# Patient Record
Sex: FEMALE | Race: BLACK OR AFRICAN AMERICAN
[De-identification: names, ages, dates, MRNs, and addresses within clinical notes are randomized per-mention and may not be internally consistent; named-entity substitution may affect disease eponyms.]

---

## 2017-12-06 NOTE — RADIOLOGY REPORT (SQ)
EXAM DESCRIPTION:  CHEST PA/LAT



COMPLETED DATE/TIME:  12/6/2017 3:22 pm



REASON FOR STUDY:  cough



COMPARISON:  None.



EXAM PARAMETERS:  NUMBER OF VIEWS: two views

TECHNIQUE: Digital Frontal and Lateral radiographic views of the chest acquired.

RADIATION DOSE: NA

LIMITATIONS: none



FINDINGS:  LUNGS AND PLEURA: No opacities, masses or pneumothorax. No pleural effusion.

MEDIASTINUM AND HILAR STRUCTURES: No masses or contour abnormalities.

HEART AND VASCULAR STRUCTURES: Heart normal size.  No evidence for failure.

BONES: No acute findings.

HARDWARE: None in the chest.

OTHER: No other significant finding.



IMPRESSION:  NO SIGNIFICANT RADIOGRAPHIC FINDING IN THE CHEST.



TECHNICAL DOCUMENTATION:  JOB ID:  [id number]

 2011 [employer]- All Rights Reserved

## 2017-12-06 NOTE — ER DOCUMENT REPORT
HPI





- HPI


Patient complains to provider of: sore throat, cough


Onset/Duration: Persistent


Quality of pain: Achy


Pain Level: 3


Context: 





Patient presents complaining of sore throat for the past 2 weeks with cough for 

the past 4 days.  Patient states that when she coughs it causes her head to 

hurt.  Patient states she has had headache off and on for the past 4 days.  

Patient states she was coughing so hard today that she did vomit after 

coughing.  Patient denies any fever.


Associated Symptoms: Nonproductive cough, Headache, Vomiting - After cough, 

Sore throat.  denies: Fever


Exacerbated by: Coughing


Relieved by: Remaining still


Similar symptoms previously: Yes


Recently seen / treated by doctor: No





- ROS


ROS below otherwise negative: Yes


Systems Reviewed and Negative: Yes All other systems reviewed and negative





- CONSTITUTIONAL


Constitutional: DENIES: Fever, Chills





- EENT


EENT: REPORTS: Sore Throat.  DENIES: Ear Pain, Eye problems





- NEURO


Neurology: REPORTS: Headache





- CARDIOVASCULAR


Cardiovascular: DENIES: Chest pain





- RESPIRATORY


Respiratory: REPORTS: Coughing.  DENIES: Trouble Breathing





- GASTROINTESTINAL


Gastrointestinal: REPORTS: Patient vomiting.  DENIES: Abdominal Pain, Diarrhea





- REPRODUCTIVE


LMP: [id number]


Reproductive: DENIES: Pregnant:





- MUSCULOSKELETAL


Musculoskeletal: DENIES: Back Pain





- DERM


Skin Color: Normal


Skin Problems: None





Past Medical History





- General


Information source: Patient





- Social History


Smoking Status: Current Every Day Smoker


Chew tobacco use (# tins/day): No


Smoking Education Provided: Yes


Frequency of alcohol use: Occasional


Drug Abuse: None


Lives with: Family


Family History: Reviewed & Not Pertinent


Patient has suicidal ideation: No


Patient has homicidal ideation: No





- Medical History


Medical History: Negative


Renal/ Medical History: Denies: Hx Peritoneal Dialysis


Past Surgical History: Reports: Hx  Section





- Immunizations


Hx Diphtheria, Pertussis, Tetanus Vaccination: Yes





Vertical Provider Document





- CONSTITUTIONAL


Agree With Documented VS: Yes


Exam Limitations: No Limitations


General Appearance: WD/WN, No Apparent Distress





- INFECTION CONTROL


TRAVEL OUTSIDE OF THE [place] IN LAST 30 DAYS: No





- HEENT


HEENT: Atraumatic, Normocephalic, Pharyngeal Tenderness, Pharyngeal Erythema.  

negative: Pharyngeal Exudate





- NECK


Neck: Normal Inspection, Supple.  negative: Lymphadenopathy-Left, 

Lymphadenopathy-Right


Notes: 





No meningismus





- RESPIRATORY


Respiratory: No Respiratory Distress, Chest Non-Tender, Wheezing - Only with 

coughing


O2 Sat by Pulse Oximetry: 100





- CARDIOVASCULAR


Cardiovascular: Regular Rate, Regular Rhythm, No Murmur





- BACK


Back: Normal Inspection





- MUSCULOSKELETAL/EXTREMETIES


Musculoskeletal/Extremeties: MAEW, FROM





- NEURO


Level of Consciousness: Awake, Alert, Appropriate


Motor/Sensory: No Motor Deficit


Notes: 





No focal neurologic deficit, cranial nerves II through XII intact





- DERM


Integumentary: Warm, Dry, No Rash





Course





- Vital Signs


Vital signs: 


 











Temp Pulse Resp BP Pulse Ox


 


 98.2 F   74   18   136/80 H  100 


 


 17 13:19  17 13:19  17 13:19  17 13:19  17 13:19














- Diagnostic Test


Radiology reviewed: Reports reviewed





Discharge





- Discharge


Clinical Impression: 


 Sore throat, Bronchospasm





Upper respiratory infection


Qualifiers:


 URI type: unspecified URI Qualified Code(s): J06.9 - Acute upper respiratory 

infection, unspecified





Condition: Stable


Disposition: HOME, SELF-CARE


Instructions:  Acetaminophen, Sore Throat ([facility]), Upper Respiratory Illness ([facility])


Additional Instructions: 


Return immediately for any new or worsening symptoms





Followup with your primary care provider, call tomorrow to make a followup 

appointment





Stop smoking


Prescriptions: 


Albuterol Sulfate [Ventolin Hfa] 2 puff IH Q4HP PRN #17 gm


 PRN Reason: 


Benzonatate [Tessalon Perle 100 mg Capsule] 100 mg PO Q8HP PRN #20 cap


 PRN Reason: 


Naproxen [Naprosyn 250 Nmg Tablet] 1 tab PO BID #14 tablet


Forms:  Smoking Cessation Education, Return to Work


Referrals: 


[facility] [Provider Group] - Follow up tomorrow

## 2018-01-03 ENCOUNTER — HOSPITAL ENCOUNTER (EMERGENCY)
Dept: HOSPITAL 62 - ER | Age: 28
Discharge: HOME | End: 2018-01-03
Payer: SELF-PAY

## 2018-01-03 VITALS — SYSTOLIC BLOOD PRESSURE: 136 MMHG | DIASTOLIC BLOOD PRESSURE: 85 MMHG

## 2018-01-03 DIAGNOSIS — F17.200: ICD-10-CM

## 2018-01-03 DIAGNOSIS — R07.9: Primary | ICD-10-CM

## 2018-01-03 LAB
ADD MANUAL DIFF: NO
ALBUMIN SERPL-MCNC: 4.2 G/DL (ref 3.5–5)
ALP SERPL-CCNC: 87 U/L (ref 38–126)
ALT SERPL-CCNC: 27 U/L (ref 9–52)
ANION GAP SERPL CALC-SCNC: 10 MMOL/L (ref 5–19)
APPEARANCE UR: CLEAR
APTT PPP: YELLOW S
AST SERPL-CCNC: 19 U/L (ref 14–36)
BASOPHILS # BLD AUTO: 0.1 10^3/UL (ref 0–0.2)
BASOPHILS NFR BLD AUTO: 1 % (ref 0–2)
BILIRUB DIRECT SERPL-MCNC: 0.2 MG/DL (ref 0–0.4)
BILIRUB SERPL-MCNC: 0.3 MG/DL (ref 0.2–1.3)
BILIRUB UR QL STRIP: NEGATIVE
BUN SERPL-MCNC: 8 MG/DL (ref 7–20)
CALCIUM: 9.8 MG/DL (ref 8.4–10.2)
CHLORIDE SERPL-SCNC: 105 MMOL/L (ref 98–107)
CO2 SERPL-SCNC: 27 MMOL/L (ref 22–30)
EOSINOPHIL # BLD AUTO: 0.1 10^3/UL (ref 0–0.6)
EOSINOPHIL NFR BLD AUTO: 1.9 % (ref 0–6)
ERYTHROCYTE [DISTWIDTH] IN BLOOD BY AUTOMATED COUNT: 18.6 % (ref 11.5–14)
GLUCOSE SERPL-MCNC: 91 MG/DL (ref 75–110)
GLUCOSE UR STRIP-MCNC: NEGATIVE MG/DL
HCT VFR BLD CALC: 35.1 % (ref 36–47)
HGB BLD-MCNC: 11.6 G/DL (ref 12–15.5)
KETONES UR STRIP-MCNC: NEGATIVE MG/DL
LYMPHOCYTES # BLD AUTO: 1.9 10^3/UL (ref 0.5–4.7)
LYMPHOCYTES NFR BLD AUTO: 37.2 % (ref 13–45)
MCH RBC QN AUTO: 27 PG (ref 27–33.4)
MCHC RBC AUTO-ENTMCNC: 32.9 G/DL (ref 32–36)
MCV RBC AUTO: 82 FL (ref 80–97)
MONOCYTES # BLD AUTO: 0.4 10^3/UL (ref 0.1–1.4)
MONOCYTES NFR BLD AUTO: 7 % (ref 3–13)
NEUTROPHILS # BLD AUTO: 2.7 10^3/UL (ref 1.7–8.2)
NEUTS SEG NFR BLD AUTO: 52.9 % (ref 42–78)
NITRITE UR QL STRIP: NEGATIVE
PH UR STRIP: 6 [PH] (ref 5–9)
PLATELET # BLD: 167 10^3/UL (ref 150–450)
POTASSIUM SERPL-SCNC: 4 MMOL/L (ref 3.6–5)
PROT SERPL-MCNC: 7 G/DL (ref 6.3–8.2)
PROT UR STRIP-MCNC: NEGATIVE MG/DL
RBC # BLD AUTO: 4.28 10^6/UL (ref 3.72–5.28)
SODIUM SERPL-SCNC: 141.6 MMOL/L (ref 137–145)
SP GR UR STRIP: 1.02
TOTAL CELLS COUNTED % (AUTO): 100 %
UROBILINOGEN UR-MCNC: 4 MG/DL (ref ?–2)
WBC # BLD AUTO: 5 10^3/UL (ref 4–10.5)

## 2018-01-03 PROCEDURE — 99285 EMERGENCY DEPT VISIT HI MDM: CPT

## 2018-01-03 PROCEDURE — 36415 COLL VENOUS BLD VENIPUNCTURE: CPT

## 2018-01-03 PROCEDURE — 85379 FIBRIN DEGRADATION QUANT: CPT

## 2018-01-03 PROCEDURE — 71046 X-RAY EXAM CHEST 2 VIEWS: CPT

## 2018-01-03 PROCEDURE — 81001 URINALYSIS AUTO W/SCOPE: CPT

## 2018-01-03 PROCEDURE — 85025 COMPLETE CBC W/AUTO DIFF WBC: CPT

## 2018-01-03 PROCEDURE — 93010 ELECTROCARDIOGRAM REPORT: CPT

## 2018-01-03 PROCEDURE — 81025 URINE PREGNANCY TEST: CPT

## 2018-01-03 PROCEDURE — 80053 COMPREHEN METABOLIC PANEL: CPT

## 2018-01-03 PROCEDURE — 93005 ELECTROCARDIOGRAM TRACING: CPT

## 2018-01-03 NOTE — RADIOLOGY REPORT (SQ)
EXAM DESCRIPTION:  CHEST PA/LAT



COMPLETED DATE/TIME:  1/3/2018 4:43 pm



REASON FOR STUDY:  cp



COMPARISON:  12/6/2017



EXAM PARAMETERS:  NUMBER OF VIEWS: two views

TECHNIQUE: Digital Frontal and Lateral radiographic views of the chest acquired.

RADIATION DOSE: NA

LIMITATIONS: none



FINDINGS:  LUNGS AND PLEURA: No opacities, masses or pneumothorax. No pleural effusion.

MEDIASTINUM AND HILAR STRUCTURES: No masses or contour abnormalities.

HEART AND VASCULAR STRUCTURES: Heart normal size.  No evidence for failure.

BONES: No acute findings.

HARDWARE: None in the chest.

OTHER: No other significant finding.



IMPRESSION:  NO SIGNIFICANT RADIOGRAPHIC FINDING IN THE CHEST.



TECHNICAL DOCUMENTATION:  JOB ID:  [id number]

 2011 [employer]- All Rights Reserved

## 2018-01-03 NOTE — EKG REPORT
SEVERITY:- BORDERLINE ECG -

SINUS RHYTHM

BORDERLINE T ABNORMALITIES, DIFFUSE LEADS

:

Confirmed by: Gage Downey 03-Jan-2018 19:05:55

## 2018-01-03 NOTE — ER DOCUMENT REPORT
ED Cardiac





- General


Chief Complaint: Chest Pain


Stated Complaint: CHEST PAIN


Time Seen by Provider: 18 16:30


Mode of Arrival: Ambulatory


Information source: Patient


Notes: 





Patient presents stating she has had chest pain since this morning.  It did get 

better after aspirin and nitroglycerin by ambulance.  She states nothing 

otherwise made the pain better or worse.  The pain was in the center of her 

chest.  She felt somewhat short of breath with it.  Patient denies any chronic 

cough cold or congestion.  No previous history of cardiac disease or DVTs.  She 

has had no recent long plane rides or car trips.  No recent surgeries.  She 

does not take any type of hormone therapy.  The pain did not radiate.  It was a 

sharp sensation.  It was intermittent.  No rashes.


TRAVEL OUTSIDE OF THE [place] IN LAST 30 DAYS: No





- Related Data


Allergies/Adverse Reactions: 


 





No Known Allergies Allergy (Verified 18 15:17)


 











Past Medical History





- General


Information source: Patient





- Social History


Smoking Status: Current Every Day Smoker


Chew tobacco use (# tins/day): No


Frequency of alcohol use: Occasional


Drug Abuse: None


Family History: Reviewed & Not Pertinent


Patient has suicidal ideation: No


Patient has homicidal ideation: No


Renal/ Medical History: Denies: Hx Peritoneal Dialysis


Past Surgical History: Reports: Hx  Section





- Immunizations


Hx Diphtheria, Pertussis, Tetanus Vaccination: Yes





Review of Systems





- Review of Systems


Constitutional: denies: Chills, Fever


Cardiovascular: Chest pain.  denies: Palpitations


Respiratory: Short of breath.  denies: Cough


Gastrointestinal: denies: Diarrhea, Vomiting


-: Yes All other systems reviewed and negative





Physical Exam





- Vital signs


Vitals: 





 











Temp Pulse Resp BP Pulse Ox


 


 98.8 F   72   18   132/75 H  100 


 


 18 15:41  18 15:41  18 15:41  18 15:41  18 15:41











Interpretation: Hypertensive





- General


General appearance: Appears well, Alert





- HEENT


Head: Normocephalic, Atraumatic


Eyes: Normal


Pupils: PERRL





- Respiratory


Respiratory status: No respiratory distress


Chest status: Nontender


Breath sounds: Normal


Chest palpation: Normal





- Cardiovascular


Rhythm: Regular


Heart sounds: Normal auscultation


Murmur: No





- Abdominal


Inspection: Normal


Distension: No distension


Bowel sounds: Normal


Tenderness: Nontender


Organomegaly: No organomegaly





- Back


Back: Normal, Nontender





- Extremities


General upper extremity: Normal inspection, Nontender, Normal color, Normal ROM

, Normal temperature


General lower extremity: Normal inspection, Nontender, Normal color, Normal ROM

, Normal temperature, Normal weight bearing.  No: Yared's sign





- Neurological


Neuro grossly intact: Yes


Cognition: Normal


Orientation: AAOx4


[place] Coma Scale Eye Opening: Spontaneous


[place] Coma Scale Verbal: Oriented


[place] Coma Scale Motor: Obeys Commands


[place] Coma Scale Total: 15


Speech: Normal


Motor strength normal: LUE, RUE, LLE, RLE


Sensory: Normal





- Psychological


Associated symptoms: Normal affect, Normal mood





- Skin


Skin Temperature: Warm


Skin Moisture: Dry


Skin Color: Normal





Course





- Re-evaluation


Re-evalutation: 





18 18:22


Wells criteria 0 with neg d-dimer makes PE unlikely.





HEART score is 2 but will rec close f/u with non specific EKG findings.





- Vital Signs


Vital signs: 





 











Temp Pulse Resp BP Pulse Ox


 


 98.8 F   72   16   132/75 H  100 


 


 18 15:41  18 15:41  18 16:30  18 15:41  18 15:41














- Laboratory


Result Diagrams: 


 18 16:54





 18 16:54


Laboratory results interpreted by me: 





 











  18





  16:54 17:00


 


Hgb  11.6 L 


 


Hct  35.1 L 


 


RDW  18.6 H 


 


Urine Urobilinogen   4.0 H


 


Ur Leukocyte Esterase   TRACE H














- Diagnostic Test


Radiology reviewed: Image reviewed, Reports reviewed - Chest x-ray shows no 

evidence of infiltrate or edema





- EKG Interpretation by Me


EKG shows normal: Sinus rhythm


Rate: Normal


Rhythm: NSR


Axis/QRS: No: Right axis deviation, Left axis deviation





Discharge





- Discharge


Clinical Impression: 


 Chest pain in adult





Condition: Stable


Disposition: HOME, SELF-CARE


Instructions:  Chest Pain of Unclear Cause ([facility])


Additional Instructions: 


Your blood pressure is mildly elevated.  Please have this rechecked within 1 

week by your doctor.





You have some nonspecific changes on your EKG.  You should have a further heart 

evaluation by your primary care physician or a cardiologist within 1 week.  It 

is important that you discuss a possible cardiac stress test with him.


Prescriptions: 


Tramadol HCl [Ultram] 50 mg PO Q6 PRN 3 Days #20 tablet


 PRN Reason: 


Forms:  Return to Work, Elevated Blood Pressure


Referrals: 


ALLYSSA LOCKETT MD [ACTIVE STAFF] - Follow up in 1 week

## 2019-02-06 ENCOUNTER — HOSPITAL ENCOUNTER (EMERGENCY)
Dept: HOSPITAL 62 - ER | Age: 29
Discharge: HOME | End: 2019-02-06
Payer: SELF-PAY

## 2019-02-06 VITALS — SYSTOLIC BLOOD PRESSURE: 126 MMHG | DIASTOLIC BLOOD PRESSURE: 78 MMHG

## 2019-02-06 DIAGNOSIS — F17.210: ICD-10-CM

## 2019-02-06 DIAGNOSIS — R07.89: Primary | ICD-10-CM

## 2019-02-06 LAB
ADD MANUAL DIFF: NO
ALBUMIN SERPL-MCNC: 4.6 G/DL (ref 3.5–5)
ALP SERPL-CCNC: 77 U/L (ref 38–126)
ALT SERPL-CCNC: 28 U/L (ref 9–52)
ANION GAP SERPL CALC-SCNC: 11 MMOL/L (ref 5–19)
AST SERPL-CCNC: 20 U/L (ref 14–36)
BASOPHILS # BLD AUTO: 0 10^3/UL (ref 0–0.2)
BASOPHILS NFR BLD AUTO: 0.9 % (ref 0–2)
BILIRUB DIRECT SERPL-MCNC: 0.1 MG/DL (ref 0–0.4)
BILIRUB SERPL-MCNC: 0.2 MG/DL (ref 0.2–1.3)
BUN SERPL-MCNC: 8 MG/DL (ref 7–20)
CALCIUM: 9.5 MG/DL (ref 8.4–10.2)
CHLORIDE SERPL-SCNC: 107 MMOL/L (ref 98–107)
CO2 SERPL-SCNC: 25 MMOL/L (ref 22–30)
EOSINOPHIL # BLD AUTO: 0.1 10^3/UL (ref 0–0.6)
EOSINOPHIL NFR BLD AUTO: 1.8 % (ref 0–6)
ERYTHROCYTE [DISTWIDTH] IN BLOOD BY AUTOMATED COUNT: 17.4 % (ref 11.5–14)
GLUCOSE SERPL-MCNC: 85 MG/DL (ref 75–110)
HCT VFR BLD CALC: 36.9 % (ref 36–47)
HGB BLD-MCNC: 12.7 G/DL (ref 12–15.5)
LYMPHOCYTES # BLD AUTO: 2 10^3/UL (ref 0.5–4.7)
LYMPHOCYTES NFR BLD AUTO: 41.3 % (ref 13–45)
MCH RBC QN AUTO: 28.9 PG (ref 27–33.4)
MCHC RBC AUTO-ENTMCNC: 34.4 G/DL (ref 32–36)
MCV RBC AUTO: 84 FL (ref 80–97)
MONOCYTES # BLD AUTO: 0.3 10^3/UL (ref 0.1–1.4)
MONOCYTES NFR BLD AUTO: 6.7 % (ref 3–13)
NEUTROPHILS # BLD AUTO: 2.4 10^3/UL (ref 1.7–8.2)
NEUTS SEG NFR BLD AUTO: 49.3 % (ref 42–78)
PLATELET # BLD: 178 10^3/UL (ref 150–450)
POTASSIUM SERPL-SCNC: 4.2 MMOL/L (ref 3.6–5)
PROT SERPL-MCNC: 7.6 G/DL (ref 6.3–8.2)
RBC # BLD AUTO: 4.39 10^6/UL (ref 3.72–5.28)
SODIUM SERPL-SCNC: 142.9 MMOL/L (ref 137–145)
TOTAL CELLS COUNTED % (AUTO): 100 %
WBC # BLD AUTO: 4.9 10^3/UL (ref 4–10.5)

## 2019-02-06 PROCEDURE — 84484 ASSAY OF TROPONIN QUANT: CPT

## 2019-02-06 PROCEDURE — 71046 X-RAY EXAM CHEST 2 VIEWS: CPT

## 2019-02-06 PROCEDURE — 99285 EMERGENCY DEPT VISIT HI MDM: CPT

## 2019-02-06 PROCEDURE — 36415 COLL VENOUS BLD VENIPUNCTURE: CPT

## 2019-02-06 PROCEDURE — 84703 CHORIONIC GONADOTROPIN ASSAY: CPT

## 2019-02-06 PROCEDURE — 93010 ELECTROCARDIOGRAM REPORT: CPT

## 2019-02-06 PROCEDURE — 83690 ASSAY OF LIPASE: CPT

## 2019-02-06 PROCEDURE — 85025 COMPLETE CBC W/AUTO DIFF WBC: CPT

## 2019-02-06 PROCEDURE — 80053 COMPREHEN METABOLIC PANEL: CPT

## 2019-02-06 PROCEDURE — 93005 ELECTROCARDIOGRAM TRACING: CPT

## 2019-02-06 PROCEDURE — 85379 FIBRIN DEGRADATION QUANT: CPT

## 2019-02-06 NOTE — RADIOLOGY REPORT (SQ)
EXAM DESCRIPTION:  CHEST 2 VIEWS



COMPLETED DATE/TIME:  2/6/2019 1:03 pm



REASON FOR STUDY:  chest pain



COMPARISON:  1/3/2018



EXAM PARAMETERS:  NUMBER OF VIEWS: two views

TECHNIQUE: Digital Frontal and Lateral radiographic views of the chest acquired.

RADIATION DOSE: NA

LIMITATIONS: none



FINDINGS:  LUNGS AND PLEURA: No opacities, masses or pneumothorax. No pleural effusion.

MEDIASTINUM AND HILAR STRUCTURES: No masses or contour abnormalities.

HEART AND VASCULAR STRUCTURES: Heart normal size.  No evidence for failure.

BONES: No acute findings.

HARDWARE: None in the chest.

OTHER: No other significant finding.



IMPRESSION:  NO ACUTE RADIOGRAPHIC FINDING IN THE CHEST.



TECHNICAL DOCUMENTATION:  JOB ID:  [id number]

 2011 [employer]- All Rights Reserved



Reading location - IP/workstation name: [email or web address]

## 2019-02-06 NOTE — ER DOCUMENT REPORT
ED Medical Screen (RME)





- General


Chief Complaint: Chest Pain


Stated Complaint: CHEST PAIN


Time Seen by Provider: 19 12:21


Notes: 





Patient is a 28-year-old female that presents to the emergency department for 

chief complaint of chest pain.  Patient reports that she is been having chest 

pain on and off for the past 2 months, she states in mid December, she woke up 

after night of drinking, with pain in her chest, is been off and on since then, 

she did quit drinking about a month ago to see if it would help but her symptoms

continue, she had associated shortness of breath as well.  Seems to be worse 

with smoking.





ROS:


Other than noted above, the 12 point review of systems was reviewed with the 

patient and were negative, all pertinent findings are included in the HPI.





PHYSICAL EXAMINATION:





Vital signs reviewed. 





GENERAL: Well-appearing, well-nourished and in no acute distress.





HEAD: Atraumatic, normocephalic.





EYES: Pupils equal round extraocular movements intact,  conjunctiva are normal.





ENT: Nares patent





NECK: Normal range of motion





CV: Heart regular rate and rhythm





LUNGS: No respiratory distress





Musculoskeletal: Normal range of motion





NEUROLOGICAL:  Normal speech





PSYCH: Normal mood, normal affect.





MDM:


Patient seen and examined for rapid initial assessment. Vital signs reviewed.  A

comprehensive ED assessment and evaluation of the patient, analysis of test 

results and completion of the medical decision making process will be conducted 

by additional ED providers.





*Note is created using voice recognition software and may contain spelling, 

syntax or grammatical errors.  











TRAVEL OUTSIDE OF THE [place] IN LAST 30 DAYS: No





- Related Data


Allergies/Adverse Reactions: 


                                        





No Known Allergies Allergy (Verified 19 11:42)


   











Past Medical History





- Social History


Chew tobacco use (# tins/day): No


Frequency of alcohol use: stopped in december


Renal/ Medical History: Denies: Hx Peritoneal Dialysis


Past Surgical History: Reports: Hx  Section





- Immunizations


Hx Diphtheria, Pertussis, Tetanus Vaccination: Yes





Physical Exam





- Vital signs


Vitals: 





                                        











Temp Pulse Resp BP Pulse Ox


 


 99.7 F   100   18   136/88 H  99 


 


 19 11:45  19 11:45  19 11:45  19 11:45  19 11:45














Course





- Vital Signs


Vital signs: 





                                        











Temp Pulse Resp BP Pulse Ox


 


 99.7 F   100   18   136/88 H  99 


 


 19 11:45  19 11:45  19 11:45  19 11:45  19 11:45

## 2019-02-08 NOTE — ER DOCUMENT REPORT
Entered by ASTER MERCER SCRIBE  [id number] 





Acting as scribe for:PHAN TOLLIVER MD





ED General





- General


Chief Complaint: Chest Pain


Stated Complaint: CHEST PAIN


Time Seen by Provider: 19 12:21


Mode of Arrival: Ambulatory


Information source: Patient


Notes: 





28-year-old female who presents to the emergency department today with 

complaints of chest pain for the last 2 months. Patient states the pain seems to

be in her upper chest.  Patient mentions that she used to "drink heavily" and 

has noticed the chest pain since she quit drinking in December.  Patient states 

her pain occurs every day intermittently.  Patient states she is sometimes 

awoken with this pain but usually always feels this pain immediately after 

waking up.  Patient received a GI cocktail in triage which she says did nothing 

for her symptoms.  Patient denies a family history of coronary artery disease.


TRAVEL OUTSIDE OF THE [place] IN LAST 30 DAYS: No





- Related Data


Allergies/Adverse Reactions: 


                                        





No Known Allergies Allergy (Verified 19 11:42)


   











Past Medical History





- General


Information source: Patient





- Social History


Smoking Status: Current Every Day Smoker


Cigarette use (# per day): Yes


Chew tobacco use (# tins/day): No


Frequency of alcohol use: stopped in december


Drug Abuse: None


Lives with: Family


Family History: Reviewed & Not Pertinent


Patient has suicidal ideation: No


Patient has homicidal ideation: No


Past Surgical History: Reports: Hx  Section





- Immunizations


Hx Diphtheria, Pertussis, Tetanus Vaccination: Yes





Review of Systems





- Review of Systems


Constitutional: No symptoms reported


EENT: No symptoms reported


Cardiovascular: See HPI, Chest pain


Respiratory: No symptoms reported


Gastrointestinal: No symptoms reported


Genitourinary: No symptoms reported


Female Genitourinary: No symptoms reported


Musculoskeletal: No symptoms reported


Skin: No symptoms reported


Hematologic/Lymphatic: No symptoms reported


Neurological/Psychological: No symptoms reported


-: Yes All other systems reviewed and negative





Physical Exam





- Vital signs


Vitals: 


                                        











Temp Pulse Resp BP Pulse Ox


 


 99.7 F   100   18   136/88 H  99 


 


 19 11:45  19 11:45  19 11:45  19 11:45  19 11:45














- Notes


Notes: 





Physical Exam:


 


General: Alert, appears well. 


 


HEENT: Normocephalic. Atraumatic. PERRL. Extraocular movements intact. 

Oropharynx clear.


 


Neck: Supple. Non-tender.


 


Respiratory: No respiratory distress. Rhonchi with forced cough.  Left-sided 

chest wall, right-sided chest wall, and sternal tenderness with palpation.





Cardiovascular: Regular rate and rhythm. 


 


Abdominal: Normal Inspection. Non-tender. No epigastric tenderness with 

palpation. No distension. Normal Bowel Sounds. 


 


Back: Non-tender. No deformity or step off.


 


Extremities: Moves all four extremities.


Upper extremities: Normal inspection. Normal ROM.  


Lower extremities: Normal inspection. No edema. Normal ROM.


 


Neurological: Normal cognition. AAOx4. Normal speech.  


 


Psychological: Normal affect. Normal Mood. 


 


Skin: Warm. Dry. Normal color.





Course





- Re-evaluation


Re-evalutation: 





19 14:23


Patient symptoms have been off and on for almost 2 months.  They are 

reproducible with movement and deep breath.  Today the pain is reproducible with

palpation.  Cardiac enzymes were undetectable.  EKG is completely normal.  The 

patient is not on any hormone therapy for birth control.


She reports that GI cocktail did not make any difference at all.  The IV Toradol

did make her pain feel better.





- Vital Signs


Vital signs: 


                                        











Temp Pulse Resp BP Pulse Ox


 


 99.7 F   100   18   136/88 H  99 


 


 19 11:45  19 11:45  19 11:45  19 11:45  19 11:45














- Laboratory


Result Diagrams: 


                                 19 12:40





                                 19 12:40


Laboratory results interpreted by me: 


                                        











  19





  12:40 13:35


 


RDW  17.4 H 


 


D-Dimer   0.51 H














- Diagnostic Test


Radiology reviewed: Image reviewed, Reports reviewed - Chest x-ray is 

unremarkable.





- EKG Interpretation by Me


EKG shows normal: Sinus rhythm, Axis, Intervals, QRS Complexes, ST-T Waves


Rate: Normal - 79


Rhythm: NSR





Discharge





- Discharge


Clinical Impression: 


 Chest wall pain





Condition: Stable


Disposition: HOME, SELF-CARE


Additional Instructions: 


Chest Wall Pain





   Your chest pain has been diagnosed as coming from the chest wall.  This is 

often caused by straining the muscles or joints in the chest during physical 

activity, direct trauma, coughing, or vigorous vomiting.  Persons with arthritis

are especially prone to this type of pain, due to inflammation of the cartilage 

joints near the breast bone.  Occasionally, no cause can be found.


   Rest from strenuous physical activity.  This kind of chest pain is usually 

made worse by movement of the chest.  Depending on the symptoms, we may 

prescribe medicine for pain, muscle relaxation, and antiinflammatory effects.


   If the pain is new, and seems to be due to muscle strain, cold packs can 

help. Otherwise, apply gentle warmth to the painful area for 15 minutes every 

hour or two.   


   You should contact the doctor immediately if things change.  Further 

evaluation is needed if you develop a fever or cough, if the nature of the pain 

changes, or if you become short of breath.








********************

***************************************************************************************************





Take ibuprofen 800 mg every 8 hours for the next several days to reduce your 

chest wall tenderness.


Limit activity that tends to make your chest hurt more.


Follow-up with your primary care provider next week if not improving.





RETURN TO THE EMERGENCY ROOM IF ANY NEW OR WORSENING SYMPTOMS.








Scribe Attestation: 





19 13:54


I personally performed the services described in the documentation, reviewed and

edited the documentation which was dictated to the scribe in my presence, and it

accurately records my words and actions.





I personally performed the services described in the documentation, reviewed and

edited the documentation which was dictated to the scribe in my presence, and it

accurately records my words and actions.

## 2019-03-09 ENCOUNTER — HOSPITAL ENCOUNTER (OUTPATIENT)
Dept: HOSPITAL 62 - ER | Age: 29
Setting detail: OBSERVATION
LOS: 1 days | Discharge: HOME | End: 2019-03-10
Attending: FAMILY MEDICINE | Admitting: FAMILY MEDICINE
Payer: SELF-PAY

## 2019-03-09 DIAGNOSIS — F17.200: ICD-10-CM

## 2019-03-09 DIAGNOSIS — Z79.82: ICD-10-CM

## 2019-03-09 DIAGNOSIS — I48.0: Primary | ICD-10-CM

## 2019-03-09 DIAGNOSIS — Z79.899: ICD-10-CM

## 2019-03-09 DIAGNOSIS — Z82.49: ICD-10-CM

## 2019-03-09 DIAGNOSIS — R53.83: ICD-10-CM

## 2019-03-09 DIAGNOSIS — F10.10: ICD-10-CM

## 2019-03-09 DIAGNOSIS — E66.01: ICD-10-CM

## 2019-03-09 LAB
ADD MANUAL DIFF: NO
ALBUMIN SERPL-MCNC: 4.6 G/DL (ref 3.5–5)
ALP SERPL-CCNC: 72 U/L (ref 38–126)
ALT SERPL-CCNC: 13 U/L (ref 9–52)
ANION GAP SERPL CALC-SCNC: 12 MMOL/L (ref 5–19)
ANION GAP SERPL CALC-SCNC: 13 MMOL/L (ref 5–19)
APPEARANCE UR: (no result)
APTT PPP: YELLOW S
AST SERPL-CCNC: 20 U/L (ref 14–36)
BARBITURATES UR QL SCN: NEGATIVE
BASOPHILS # BLD AUTO: 0 10^3/UL (ref 0–0.2)
BASOPHILS NFR BLD AUTO: 0.5 % (ref 0–2)
BILIRUB DIRECT SERPL-MCNC: 0.2 MG/DL (ref 0–0.4)
BILIRUB SERPL-MCNC: 0.2 MG/DL (ref 0.2–1.3)
BILIRUB UR QL STRIP: NEGATIVE
BUN SERPL-MCNC: 10 MG/DL (ref 7–20)
BUN SERPL-MCNC: 10 MG/DL (ref 7–20)
CALCIUM: 9 MG/DL (ref 8.4–10.2)
CALCIUM: 9.8 MG/DL (ref 8.4–10.2)
CHLORIDE SERPL-SCNC: 104 MMOL/L (ref 98–107)
CHLORIDE SERPL-SCNC: 107 MMOL/L (ref 98–107)
CK MB SERPL-MCNC: 0.33 NG/ML (ref ?–4.55)
CK MB SERPL-MCNC: 0.38 NG/ML (ref ?–4.55)
CK MB SERPL-MCNC: 0.42 NG/ML (ref ?–4.55)
CO2 SERPL-SCNC: 21 MMOL/L (ref 22–30)
CO2 SERPL-SCNC: 21 MMOL/L (ref 22–30)
EOSINOPHIL # BLD AUTO: 0.1 10^3/UL (ref 0–0.6)
EOSINOPHIL NFR BLD AUTO: 1.4 % (ref 0–6)
ERYTHROCYTE [DISTWIDTH] IN BLOOD BY AUTOMATED COUNT: 18 % (ref 11.5–14)
ERYTHROCYTE [DISTWIDTH] IN BLOOD BY AUTOMATED COUNT: 18.1 % (ref 11.5–14)
ETHANOL SERPL-MCNC: 92 MG/DL
FREE T4 (FREE THYROXINE): 1.23 NG/DL (ref 0.78–2.19)
GLUCOSE SERPL-MCNC: 111 MG/DL (ref 75–110)
GLUCOSE SERPL-MCNC: 89 MG/DL (ref 75–110)
GLUCOSE UR STRIP-MCNC: NEGATIVE MG/DL
HCT VFR BLD CALC: 34.2 % (ref 36–47)
HCT VFR BLD CALC: 35.4 % (ref 36–47)
HGB BLD-MCNC: 11.8 G/DL (ref 12–15.5)
HGB BLD-MCNC: 12.3 G/DL (ref 12–15.5)
KETONES UR STRIP-MCNC: NEGATIVE MG/DL
LDLC SERPL DIRECT ASSAY-MCNC: 85 MG/DL (ref ?–100)
LYMPHOCYTES # BLD AUTO: 2.1 10^3/UL (ref 0.5–4.7)
LYMPHOCYTES NFR BLD AUTO: 26.6 % (ref 13–45)
MCH RBC QN AUTO: 28.6 PG (ref 27–33.4)
MCH RBC QN AUTO: 28.9 PG (ref 27–33.4)
MCHC RBC AUTO-ENTMCNC: 34.4 G/DL (ref 32–36)
MCHC RBC AUTO-ENTMCNC: 34.7 G/DL (ref 32–36)
MCV RBC AUTO: 83 FL (ref 80–97)
MCV RBC AUTO: 83 FL (ref 80–97)
METHADONE UR QL SCN: NEGATIVE
MONOCYTES # BLD AUTO: 0.5 10^3/UL (ref 0.1–1.4)
MONOCYTES NFR BLD AUTO: 5.9 % (ref 3–13)
NEUTROPHILS # BLD AUTO: 5.1 10^3/UL (ref 1.7–8.2)
NEUTS SEG NFR BLD AUTO: 65.6 % (ref 42–78)
NITRITE UR QL STRIP: NEGATIVE
PCP UR QL SCN: NEGATIVE
PH UR STRIP: 7 [PH] (ref 5–9)
PLATELET # BLD: 192 10^3/UL (ref 150–450)
PLATELET # BLD: 206 10^3/UL (ref 150–450)
POTASSIUM SERPL-SCNC: 3.7 MMOL/L (ref 3.6–5)
POTASSIUM SERPL-SCNC: 4.1 MMOL/L (ref 3.6–5)
PROT SERPL-MCNC: 7.5 G/DL (ref 6.3–8.2)
PROT UR STRIP-MCNC: NEGATIVE MG/DL
RBC # BLD AUTO: 4.11 10^6/UL (ref 3.72–5.28)
RBC # BLD AUTO: 4.25 10^6/UL (ref 3.72–5.28)
SODIUM SERPL-SCNC: 137.4 MMOL/L (ref 137–145)
SODIUM SERPL-SCNC: 141.1 MMOL/L (ref 137–145)
SP GR UR STRIP: 1.02
T3FREE SERPL-MCNC: 4.47 PG/ML (ref 2.77–5.27)
TOTAL CELLS COUNTED % (AUTO): 100 %
TRIGL SERPL-MCNC: 93 MG/DL (ref ?–150)
TROPONIN I SERPL-MCNC: < 0.012 NG/ML
TSH SERPL-ACNC: 2.34 UIU/ML (ref 0.47–4.68)
URINE AMPHETAMINES SCREEN: NEGATIVE
URINE BENZODIAZEPINES SCREEN: NEGATIVE
URINE COCAINE SCREEN: NEGATIVE
URINE MARIJUANA (THC) SCREEN: NEGATIVE
UROBILINOGEN UR-MCNC: 4 MG/DL (ref ?–2)
VLDLC SERPL CALC-MCNC: 19 MG/DL (ref 10–31)
WBC # BLD AUTO: 6.1 10^3/UL (ref 4–10.5)
WBC # BLD AUTO: 7.8 10^3/UL (ref 4–10.5)

## 2019-03-09 PROCEDURE — 80048 BASIC METABOLIC PNL TOTAL CA: CPT

## 2019-03-09 PROCEDURE — 96366 THER/PROPH/DIAG IV INF ADDON: CPT

## 2019-03-09 PROCEDURE — 80053 COMPREHEN METABOLIC PANEL: CPT

## 2019-03-09 PROCEDURE — 84443 ASSAY THYROID STIM HORMONE: CPT

## 2019-03-09 PROCEDURE — 83735 ASSAY OF MAGNESIUM: CPT

## 2019-03-09 PROCEDURE — 85027 COMPLETE CBC AUTOMATED: CPT

## 2019-03-09 PROCEDURE — 81001 URINALYSIS AUTO W/SCOPE: CPT

## 2019-03-09 PROCEDURE — 85025 COMPLETE CBC W/AUTO DIFF WBC: CPT

## 2019-03-09 PROCEDURE — 84484 ASSAY OF TROPONIN QUANT: CPT

## 2019-03-09 PROCEDURE — 80307 DRUG TEST PRSMV CHEM ANLYZR: CPT

## 2019-03-09 PROCEDURE — 83036 HEMOGLOBIN GLYCOSYLATED A1C: CPT

## 2019-03-09 PROCEDURE — 80061 LIPID PANEL: CPT

## 2019-03-09 PROCEDURE — 93005 ELECTROCARDIOGRAM TRACING: CPT

## 2019-03-09 PROCEDURE — 96365 THER/PROPH/DIAG IV INF INIT: CPT

## 2019-03-09 PROCEDURE — 82550 ASSAY OF CK (CPK): CPT

## 2019-03-09 PROCEDURE — 84703 CHORIONIC GONADOTROPIN ASSAY: CPT

## 2019-03-09 PROCEDURE — 82553 CREATINE MB FRACTION: CPT

## 2019-03-09 PROCEDURE — 84439 ASSAY OF FREE THYROXINE: CPT

## 2019-03-09 PROCEDURE — 84481 FREE ASSAY (FT-3): CPT

## 2019-03-09 PROCEDURE — 99285 EMERGENCY DEPT VISIT HI MDM: CPT

## 2019-03-09 PROCEDURE — 36415 COLL VENOUS BLD VENIPUNCTURE: CPT

## 2019-03-09 PROCEDURE — 85379 FIBRIN DEGRADATION QUANT: CPT

## 2019-03-09 PROCEDURE — 93010 ELECTROCARDIOGRAM REPORT: CPT

## 2019-03-09 PROCEDURE — G0378 HOSPITAL OBSERVATION PER HR: HCPCS

## 2019-03-09 PROCEDURE — 71045 X-RAY EXAM CHEST 1 VIEW: CPT

## 2019-03-09 RX ADMIN — ATENOLOL SCH MG: 50 TABLET ORAL at 13:44

## 2019-03-09 RX ADMIN — DOCUSATE SODIUM SCH: 100 CAPSULE, LIQUID FILLED ORAL at 18:30

## 2019-03-09 RX ADMIN — NALBUPHINE HYDROCHLORIDE PRN MG: 10 INJECTION, SOLUTION INTRAMUSCULAR; INTRAVENOUS; SUBCUTANEOUS at 13:44

## 2019-03-09 RX ADMIN — FAMOTIDINE SCH MG: 20 TABLET, FILM COATED ORAL at 21:05

## 2019-03-09 RX ADMIN — Medication SCH ML: at 21:06

## 2019-03-09 RX ADMIN — ASPIRIN SCH MG: 81 TABLET, COATED ORAL at 13:44

## 2019-03-09 RX ADMIN — DOCUSATE SODIUM SCH MG: 100 CAPSULE, LIQUID FILLED ORAL at 13:44

## 2019-03-09 RX ADMIN — FAMOTIDINE SCH MG: 20 TABLET, FILM COATED ORAL at 13:44

## 2019-03-09 RX ADMIN — NALBUPHINE HYDROCHLORIDE PRN MG: 10 INJECTION, SOLUTION INTRAMUSCULAR; INTRAVENOUS; SUBCUTANEOUS at 21:06

## 2019-03-09 RX ADMIN — Medication SCH: at 13:46

## 2019-03-09 NOTE — EKG REPORT
SEVERITY:- ABNORMAL ECG -

ATRIAL FIBRILLATION, V-RATE 

BORDERLINE T ABNORMALITIES, INFERIOR LEADS

:

Confirmed by: Kimberley Moreno MD 09-Mar-2019 12:34:30

## 2019-03-09 NOTE — ER DOCUMENT REPORT
ED General





- General


Stated Complaint: CHEST PAIN


Time Seen by Provider: 19 02:51


Notes: 





Patient is a pleasant 29-year-old female presents with complaint of rapid 

heartbeat and some chest tightness.  Patient says that she has had this symptoms

a few times before.  She is been seen twice in the ER due to chest pain.  She 

said both times she had very rapid heartbeat but by the time she got the chest 

pain the rapid heartbeat resolved.  She continued chest pain at those times.  

Workups were negative and she was discharged home.  But this times occurred 

after drinking large amounts of alcohol.  Patient states she quit drinking for 

about a month but then drink alcohol tonight and the symptoms returned.  She 

says she felt like her heart was racing had some tightness and sharp pain in her

chest.  Paramedics arrived and she was in A. fib with a heart rate in the 180s. 

They gave her 20 mg of Cardizem which slowed her heart rate down.  She denies 

being on medications.  She has no chronic medical problems.  She says she is 

otherwise healthy.  Patient adamantly denies any history of cocaine use.


TRAVEL OUTSIDE OF THE [place] IN LAST 30 DAYS: No





- Related Data


Allergies/Adverse Reactions: 


                                        





No Known Allergies Allergy (Verified 19 11:42)


   











Past Medical History





- Social History


Smoking Status: Current Every Day Smoker


Frequency of alcohol use: Occasional


Drug Abuse: None


Family History: Reviewed & Not Pertinent


Renal/ Medical History: Denies: Hx Peritoneal Dialysis


Past Surgical History: Reports: Hx  Section





- Immunizations


Hx Diphtheria, Pertussis, Tetanus Vaccination: Yes





Review of Systems





- Review of Systems


Notes: 





My Normal Review Basic





REVIEW OF SYSTEMS:


CONSTITUTIONAL :  Denies fever,  chills, or sweats.  Denies recent illness.


EENT:   Denies eye, ear, throat, or mouth pain or symptoms.  Denies nasal or 

sinus congestion.


CARDIOVASCULAR: Chest pain. mild rapid heartbeat


RESPIRATORY:  Denies cough, cold, or chest congestion.  Denies shortness of 

breath, difficulty breathing, or wheezing.


GASTROINTESTINAL:  Denies abdominal pain.  Denies nausea, vomiting, or diarrhea.


MUSCULOSKELETAL:  Denies neck or back pain or joint pain or swelling.


SKIN:   Denies rash or skin lesions.


HEMATOLOGIC :   Denies easy bruising or bleeding.


NEUROLOGICAL:  Denies altered mental status or loss of consciousness.  Denies 

headache.  Denies weakness or paralysis or loss of use of either side.  Denies 

problems with gait or speech.  Denies sensory or motor loss.


ALL OTHER SYSTEMS REVIEWED AND NEGATIVE.





Physical Exam





- Vital signs


Vitals: 


                                        











Resp Pulse Ox


 


 17   100 


 


 19 03:03  19 03:03














- Notes


Notes: 





General Appearance: Well nourished, alert, cooperative, no acute distress, no 

obvious discomfort.  Well-appearing.


Vitals: reviewed, See vital signs table.


Head: no swelling or tenderness to the head


Eyes: PERRL, EOMI, Conjuctiva clear


Mouth: No decreasd moisture


Throat: No tonsillar inflammation, No airway obstruction,  No lymphadenopathy


Neck: Supple, no neck tenderness, No thyromegaly


Lungs: No wheezing, No rales, No rhonci, No accessory muscle use, good air 

exchange bilaterally.


Heart: Rapid rate, Irregular rythm, No murmur, no rub


Abdomen: Normal BS, soft, No rigidity, No abdominal tenderness, No guarding, no 

rebound, no abdominal masses, no organomegaly


Extremities: strength 5/5 in all extremities, good pulses in all extremities, no

swelling or tenderness in the extremities, no edema.


Skin: warm, dry, appropriate color, no rash


Neuro: speech clear, oriented x 3, normal affect, responds appropriately to 

questions.





Course





- Re-evaluation


Re-evalutation: 





19 04:30


Patient's heart rate is starting to get fast again.  I have increased the 

Cardizem drip to 10 mg.  I will give her a dose of Lopressor.  I did ask her 

again before ordering the Lopressor if she has any history of cocaine use and 

she adamantly denies any history of cocaine use.  She otherwise says she feels 

well and has no further concerns at this time.


19 04:57








Just before giving patient Lopressor she appeared to convert on the monitor.  

Lopressor was therefore not given.  EKG affirms that the patient is now on sinus

rhythm.


19 05:07








Patient's has had some small runs of A. fib then converted back to normal sinus.

 She is now been in normal sinus for approximately 15 minutes.  Turned off the 

Cardizem drip.  Patient's chads 2 score is 0.  Her chads 2 vascular score is 1 

and therefore antiplatelet is recommended anticoagulation.  I have given a dose 

of aspirin.  The patient's obvious is been having recurrent atrial fibrillation 

now for just over a month based on her symptoms at home by his suspect of 

admission is appropriate.  I did speak with the patient and she is agreeable to 

admission.  I did speak with the hospitalist, Dr. Weiland, who says that he will

have to pass admission off to the daytime team for further consideration for 

admission.





Dictation of this chart was performed using voice recognition software; 

therefore, there may be some unintended grammatical errors.





- Vital Signs


Vital signs: 


                                        











Temp Pulse Resp BP Pulse Ox


 


 99.1 F   131 H  17   131/81 H  98 


 


 19 03:24  19 03:24  19 04:51  19 04:51  19 04:51














- Laboratory


Result Diagrams: 


                                 19 03:15





                                 19 03:15


Laboratory results interpreted by me: 


                                        











  19





  03:15 03:15


 


Hct  35.4 L 


 


RDW  18.1 H 


 


Carbon Dioxide   21 L


 


Glucose   111 H














- EKG Interpretation by Me


Additional EKG results interpreted by me: 





19 02:58


EKGs reviewed and interpreted by me.  EKG shows A. fib with a rate of around 125

bpm.  No ST segment elevation or depression.  QRS duration and QT intervals are 

within normal range.  Old EKG for comparison is from 2019.








Discharge





- Discharge


Clinical Impression: 


Atrial fibrillation


Qualifiers:


 Atrial fibrillation type: paroxysmal Qualified Code(s): I48.0 - Paroxysmal 

atrial fibrillation





Condition: Stable


Disposition: ADMITTED AS OBSERVATION


Admitting Provider: Hospitalist


Unit Admitted: Telemetry

## 2019-03-09 NOTE — EKG REPORT
SEVERITY:- ABNORMAL ECG -

SINUS RHYTHM

NONSPECIFIC T ABNORMALITIES, ANTERIOR LEADS

:

Confirmed by: Kimberley Moreno MD 09-Mar-2019 22:17:04

## 2019-03-09 NOTE — PDOC H&P
History of Present Illness


Admission Date/PCP: 


3/9/2019


Patient complains of: Palpitations


History of Present Illness: 


CORNELIUS CHAPMAN is a 29 year old female who presents the emergency room with 

acute palpitations.  Patient admits that she began suddenly having a very rapid 

heartbeat and experiencing chest tightness accompanied by mild dyspnea.  She had

the sensation that her heart rate was somewhat irregular and was severely fast 

making her feel very uncomfortable.  She admitted having several prior similar 

episodes which resolved spontaneously after a minute or 2.  She has not identi

fied any aggravating or ameliorating factors for her palpitations but admits 

that she was drinking at least 3 alcohol drinks earlier in the evening prior to 

the onset of the episode.  This episode did not resolve, thus she called EMS and

was found to have a heart rate of greater than 180 which was treated with a 

diltiazem bolus.  By the time she arrived at the emergency room her heart rate 

was down to 140 and get his resolved to approximately 100 with a diltiazem drip.

 She will be admitted for further evaluation and treatment with initiation of 

oral therapy.





Past Medical History


Cardiac Medical History: 


   Denies: Atrial Fibrillation, Coronary Artery Disease, DVT, Hyperlipidema, 

Hypertension, Pulmonary Embolism


Pulmonary Medical History: 


   Denies: Asthma, Chronic Obstructive Pulmonary Disease (COPD)


EENT Medical History: 


   Denies: Cataracts, Eyes - Corrective lenses


Neurological Medical History: 


   Denies: Hemorrhagic CVA, Ischemic CVA, Migraine, Multiple Sclerosis, Seizures


Endocrine Medical History: Reports: Obesity


   Denies: Diabetes Mellitus Type 1, Diabetes Mellitus Type 2, Hyperthyroidism, 

Hypothyroidism


Renal/ Medical History: 


   Denies: Chronic Kidney Disease, Nephrolithiasis


Malignancy Medical History: Reports: None


GI Medical History: 


   Denies: Cirrhosis, Hepatitis


Musculoskeltal Medical History: 


   Denies: Arthritis, Fibromyalgia, Gout


Skin Medical History: 


   Denies: Eczema, Psoriasis


Psychiatric Medical History: Reports: Tobacco Dependency


   Denies: Alcohol Dependency, Substance Abuse


Traumatic Medical History: 


   Denies: None


Hematology: 


   Denies: Anemia, Bleeding Tendencies


Infectious Medical History: Reports: None





Past Surgical History


Past Surgical History: Reports:  Section





Social History


Information Source: Patient


Lives with: Family


Smoking Status: Current Every Day Smoker


Frequency of Alcohol Use: Social


Hx Recreational Drug Use: No


Drugs: None


Hx Prescription Drug Abuse: No





- Advance Directive


Resuscitation Status: Full Code


Surrogate healthcare decision maker:: 


Her significant other





Family History


Family History: DM, Hypertension


Parental Family History Reviewed: Yes


Children Family History Reviewed: No


Sibling(s) Family History Reviewed.: Yes





Medication/Allergy


Home Medications: 








Ciprofloxacin HCl [Cipro 500 mg Tablet] 500 mg PO BID #20 tablet 14 


Phenazopyridine HCl [Pyridium 200 mg Tablet] 200 mg PO TID #15 tablet 14 


Albuterol Sulfate [Ventolin Hfa] 2 puff IH Q4HP PRN #17 gm 17 


Benzonatate [Tessalon Perle 100 mg Capsule] 100 mg PO Q8HP PRN #20 cap 17 


Naproxen [Naprosyn 250 Nmg Tablet] 1 tab PO BID #14 tablet 17 


Tramadol HCl [Ultram] 50 mg PO Q6 PRN 3 Days #20 tablet 18 








Allergies/Adverse Reactions: 


                                        





No Known Allergies Allergy (Verified 19 11:42)


   











Review of Systems


Constitutional: PRESENT: as per HPI, fatigue.  ABSENT: chills, fever(s)


Eyes: ABSENT: visual disturbances, other - Ocular pain


Ears: ABSENT: other - Ear pain


Nose, Mouth, and Throat: ABSENT: mouth pain, sore throat


Cardiovascular: PRESENT: as per HPI, chest pain - Tightness, palpitations.  

ABSENT: dyspnea on exertion, edema, orthropnea


Respiratory: PRESENT: as per HPI, dyspnea.  ABSENT: cough


Gastrointestinal: ABSENT: abdominal pain, constipation, diarrhea, nausea, vo

miting


Genitourinary: ABSENT: dysuria, hematuria


Musculoskeletal: ABSENT: deformity, joint swelling


Integumentary: ABSENT: pruritus, rash


Neurological: ABSENT: confusion, convulsions, focal weakness, memory loss


Psychiatric: ABSENT: anxiety, depression


Endocrine: ABSENT: cold intolerance, heat intolerance


Hematologic/Lymphatic: ABSENT: easy bleeding, easy bruising





Physical Exam


Vital Signs: 


                                        











Temp Pulse Resp BP Pulse Ox


 


 99.1 F   131 H  17   113/60   97 


 


 19 03:24  19 03:24  19 06:11  19 06:11  19 06:11








                                 Intake & Output











 19





 23:59 23:59 23:59


 


Intake Total   12


 


Balance   12


 


Weight   112.4 kg











General appearance: PRESENT: no acute distress, cooperative, morbidly obese


Head exam: PRESENT: atraumatic, normocephalic


Eye exam: PRESENT: conjunctiva pink, EOMI.  ABSENT: scleral icterus


Ear exam: PRESENT: normal external ear exam.  ABSENT: bleeding, drainage


Mouth exam: PRESENT: dry mucosa, neck supple


Neck exam: ABSENT: thyromegaly, tracheal deviation


Respiratory exam: PRESENT: clear to auscultation janneth, symmetrical, unlabored


Cardiovascular exam: PRESENT: RRR.  ABSENT: clicks, gallop, rubs


Pulses: PRESENT: normal radial pulses, normal dorsalis pedis pul


Vascular exam: PRESENT: normal capillary refill.  ABSENT: pallor


GI/Abdominal exam: PRESENT: normal bowel sounds, soft


Rectal exam: PRESENT: deferred


Extremities exam: ABSENT: joint swelling, pedal edema


Musculoskeletal exam: PRESENT: full ROM, normal inspection


Neurological exam: PRESENT: alert, oriented to person, oriented to place, 

oriented to time, oriented to situation, CN II-XII grossly intact.  ABSENT: 

motor sensory deficit


Psychiatric exam: PRESENT: appropriate affect, normal mood


Skin exam: PRESENT: dry, intact, warm.  ABSENT: jaundice, rash, urticaria





Results


Laboratory Results: 


                                        





                                 19 03:15 





                                 19 03:15 





                                        











  19





  03:15 03:15 03:15


 


WBC  7.8  


 


RBC  4.25  


 


Hgb  12.3  


 


Hct  35.4 L  


 


MCV  83  


 


MCH  28.9  


 


MCHC  34.7  


 


RDW  18.1 H  


 


Plt Count  206  


 


Seg Neutrophils %  65.6  


 


Lymphocytes %  26.6  


 


Monocytes %  5.9  


 


Eosinophils %  1.4  


 


Basophils %  0.5  


 


Absolute Neutrophils  5.1  


 


Absolute Lymphocytes  2.1  


 


Absolute Monocytes  0.5  


 


Absolute Eosinophils  0.1  


 


Absolute Basophils  0.0  


 


Sodium   141.1 


 


Potassium   4.1 


 


Chloride   107 


 


Carbon Dioxide   21 L 


 


Anion Gap   13 


 


BUN   10 


 


Creatinine   0.70 


 


Est GFR ( Amer)   > 60 


 


Est GFR (Non-Af Amer)   > 60 


 


Glucose   111 H 


 


Calcium   9.8 


 


Magnesium   1.9 


 


Total Bilirubin   0.2 


 


AST   20 


 


ALT   13 


 


Alkaline Phosphatase   72 


 


Total Protein   7.5 


 


Albumin   4.6 


 


TSH    2.34


 


Free T4    1.23


 


Free T3 pg/mL    4.47


 


Serum HCG, Qual   














  19





  03:15


 


WBC 


 


RBC 


 


Hgb 


 


Hct 


 


MCV 


 


MCH 


 


MCHC 


 


RDW 


 


Plt Count 


 


Seg Neutrophils % 


 


Lymphocytes % 


 


Monocytes % 


 


Eosinophils % 


 


Basophils % 


 


Absolute Neutrophils 


 


Absolute Lymphocytes 


 


Absolute Monocytes 


 


Absolute Eosinophils 


 


Absolute Basophils 


 


Sodium 


 


Potassium 


 


Chloride 


 


Carbon Dioxide 


 


Anion Gap 


 


BUN 


 


Creatinine 


 


Est GFR ( Amer) 


 


Est GFR (Non-Af Amer) 


 


Glucose 


 


Calcium 


 


Magnesium 


 


Total Bilirubin 


 


AST 


 


ALT 


 


Alkaline Phosphatase 


 


Total Protein 


 


Albumin 


 


TSH 


 


Free T4 


 


Free T3 pg/mL 


 


Serum HCG, Qual  NEGATIVE








                                        











  19





  03:15


 


Troponin I  < 0.012











Impressions: 


                                        





Chest X-Ray  19 03:27


IMPRESSION:


 


No acute cardiopulmonary process


 


 


copyright  [employer]- All Rights Reserved


 














Assessment & Plan





- Diagnosis


(1) Paroxysmal atrial fibrillation with rapid ventricular response


Is this a current diagnosis for this admission?: Yes   


Plan: 


Patient has been treated with IV Cardizem which be discontinued in favor oral 

therapy utilizing a atenolol and she can also be continued on low-dose aspirin 

therapy and her risk category for prevention of possible embolic stroke.  

Additional causes of acute atrial fibrillation will be investigated with 

laboratory testing.








(2) Morbid obesity


Is this a current diagnosis for this admission?: Yes   


Plan: 


Patient be advised by the nutritionist as to lifestyle and diet changes to 

enhance her medical health as well as her entire well-being








(3) Tobacco use disorder, moderate, dependence


Is this a current diagnosis for this admission?: Yes   


Plan: 


Smoking cessation has been advised and counseled briefly.  A nicotine patch will

 be available to the patient if she desires








(4) Alcohol abuse


Is this a current diagnosis for this admission?: Yes   


Plan: 


Patient has been advised that use of alcohol can result in more frequent and 

more severe episodes of her arrhythmia.  Discontinuation of alcohol use is been 

advised.








- Time


Time Spent: 30 to 50 Minutes


Critical Time spent with patient: Less than 15 minutes


Smoking Cessation Education: 3 to 10 minutes


Anticipated discharge: Home





- Inpatient Certification


Based on my medical assessment, after consideration of the patient's 

comorbidities, presenting symptoms, or acuity I expect that the services needed 

warrant INPATIENT care.: No


I certify that my determination is in accordance with my understanding of 

Medicare's requirements for reasonable and necessary INPATIENT services [42 CFR 

412.3e].: No


Medical Necessity: Need Close Monitoring Due to Risk of Patient Decompensation, 

Need For Continuous Telemetry Monitoring, Risk of Complication if Not Cared For 

in Hospital

## 2019-03-09 NOTE — RADIOLOGY REPORT (SQ)
EXAM DESCRIPTION: 



XR CHEST 1 VIEW



COMPLETED DATE/TME:  03/09/2019 03:27



CLINICAL HISTORY: 



29 years, Female, atrial fib



COMPARISON:

1/3/2018 chest



NUMBER OF VIEWS:

1



TECHNIQUE:

Portable chest



LIMITATIONS:

None.



FINDINGS:



Heart size is normal. Mild elevation right hemidiaphragm. Lungs

are clear. No pneumothorax



IMPRESSION:



No acute cardiopulmonary process

 



copyright 2011 [employer]- All Rights Reserved

## 2019-03-09 NOTE — EKG REPORT
SEVERITY:- OTHERWISE NORMAL ECG -

SINUS TACHYCARDIA

:

Confirmed by: Kimberley Moreno MD 09-Mar-2019 12:34:22

## 2019-03-10 VITALS — DIASTOLIC BLOOD PRESSURE: 63 MMHG | SYSTOLIC BLOOD PRESSURE: 122 MMHG

## 2019-03-10 RX ADMIN — ATENOLOL SCH MG: 50 TABLET ORAL at 09:25

## 2019-03-10 RX ADMIN — MORPHINE SULFATE PRN MG: 10 INJECTION INTRAMUSCULAR; INTRAVENOUS; SUBCUTANEOUS at 05:52

## 2019-03-10 RX ADMIN — ASPIRIN SCH MG: 81 TABLET, COATED ORAL at 09:25

## 2019-03-10 RX ADMIN — MORPHINE SULFATE PRN MG: 10 INJECTION INTRAMUSCULAR; INTRAVENOUS; SUBCUTANEOUS at 08:16

## 2019-03-10 RX ADMIN — DOCUSATE SODIUM SCH MG: 100 CAPSULE, LIQUID FILLED ORAL at 09:25

## 2019-03-10 RX ADMIN — MORPHINE SULFATE PRN MG: 10 INJECTION INTRAMUSCULAR; INTRAVENOUS; SUBCUTANEOUS at 00:06

## 2019-03-10 RX ADMIN — FAMOTIDINE SCH MG: 20 TABLET, FILM COATED ORAL at 09:25

## 2019-03-10 RX ADMIN — Medication SCH ML: at 05:51

## 2019-03-10 NOTE — PDOC DISCHARGE SUMMARY
General





- Admit/Disc Date/PCP


Admission Date/Primary Care Provider: 


  03/09/19 08:44





  





Discharge Date: 03/10/19





- Discharge Diagnosis


(1) Paroxysmal atrial fibrillation with rapid ventricular response


Is this a current diagnosis for this admission?: Yes   


Summary: 


New diagnosis this admission, however symptoms have been on-going for 2-3 months


- Treated with beta-blocker and achieved good rate control


- [id number] score: 1 (low risk)


- Work up: electrolytes and TSH wnl





Outpatient management:


- Script given for Metoprolol Succinate 50mg daily for rate control


- Script given for ASA 81mg PO daily for CVA prevention


- Lifestyle interventions discussed including cutting back on caffeine and 

alcohol use; decreasing stress level; stop smoking 








(2) Alcohol abuse


Is this a current diagnosis for this admission?: Yes   


Summary: 


Discussed with patient importance of cutting back on EtOH use








(3) Morbid obesity


Is this a current diagnosis for this admission?: Yes   


Summary: 


Discussed lifestyle, diet, and exercise changes; per above








(4) Tobacco use disorder, moderate, dependence


Is this a current diagnosis for this admission?: Yes   


Summary: 


Per above


-Script given for nicotene patches 








- Additional Information


Resuscitation Status: Full Code


Discharge Diet: Cardiac


Discharge Activity: Activity As Tolerated


Prescriptions: 


Aspirin [Ecotrin 81 mg EC Tablet] 81 mg PO DAILY #30 tabec


Metoprolol Succinate [Toprol Xl] 50 mg PO DAILY #30 tab.er.24h


Nicotine [Nicoderm 21 mg/24 Hr Transderm Patch] 1 each TD DAILYP PRN #30 

[email or web address]


 PRN Reason: 


Home Medications: 








Aspirin [Ecotrin 81 mg EC Tablet] 81 mg PO DAILY #30 tabec 03/10/19 


Metoprolol Succinate [Toprol Xl] 50 mg PO DAILY #30 tab.er.24h 03/10/19 


Nicotine [Nicoderm 21 mg/24 Hr Transderm Patch] 1 each TD DAILYP PRN #30 

[email or web address] 03/10/19 











History of Present Illness


History of Present Illness: 


CORNELIUS CHAPMAN is a 29 year old female who presents the emergency room with 

acute palpitations.  Patient admits that she began suddenly having a very rapid 

heartbeat and experiencing chest tightness accompanied by mild dyspnea.  She had

the sensation that her heart rate was somewhat irregular and was severely fast 

making her feel very uncomfortable.  She admitted having several prior similar 

episodes which resolved spontaneously after a minute or 2.  She has not 

identified any aggravating or ameliorating factors for her palpitations but 

admits that she was drinking at least 3 alcohol drinks earlier in the evening 

prior to the onset of the episode.  This episode did not resolve, thus she 

called EMS and was found to have a heart rate of greater than 180 which was 

treated with a diltiazem bolus.  By the time she arrived at the emergency room 

her heart rate was down to 140 and get his resolved to approximately 100 with a 

diltiazem drip.  She will be admitted for further evaluation and treatment with 

initiation of oral therapy. 








Physical Exam


Vital Signs: 


                                        











Temp Pulse Resp BP Pulse Ox


 


 98.3 F   58 L  17   122/63   100 


 


 03/10/19 08:52  03/10/19 08:52  03/10/19 08:52  03/10/19 08:52  03/10/19 08:52








                                 Intake & Output











 03/09/19 03/10/19 03/11/19





 05:59 06:59 06:59


 


Intake Total   


 


Output Total   


 


Balance   


 


Weight   











General appearance: PRESENT: no acute distress, cooperative, morbidly obese


Head exam: PRESENT: atraumatic, normocephalic


Mouth exam: PRESENT: moist


Respiratory exam: PRESENT: unlabored.  ABSENT: tachypnea


Cardiovascular exam: PRESENT: +S1, +S2, other - Rate control.  ABSENT: systolic 

murmur, tachycardia


GI/Abdominal exam: PRESENT: soft.  ABSENT: tenderness


Extremities exam: ABSENT: +1 edema


Neurological exam: PRESENT: alert, awake, CN II-XII grossly intact


Psychiatric exam: PRESENT: appropriate affect, normal mood


Skin exam: PRESENT: dry, intact





Results


Laboratory Results: 


                                        





                                 03/09/19 08:56 





                                 03/09/19 08:56 





                                        











  03/09/19 03/09/19 03/09/19





  08:56 08:56 08:56


 


WBC   6.1 


 


RBC   4.11 


 


Hgb   11.8 L 


 


Hct   34.2 L 


 


MCV   83 


 


MCH   28.6 


 


MCHC   34.4 


 


RDW   18.0 H 


 


Plt Count   192 


 


Sodium    137.4


 


Potassium    3.7


 


Chloride    104


 


Carbon Dioxide    21 L


 


Anion Gap    12


 


BUN    10


 


Creatinine    0.65


 


Est GFR ( Amer)    > 60


 


Est GFR (Non-Af Amer)    > 60


 


Glucose    89


 


Calcium    9.0


 


Magnesium    1.8


 


Triglycerides   


 


Cholesterol   


 


LDL Cholesterol Direct   


 


VLDL Cholesterol   


 


HDL Cholesterol   


 


TSH  3.49  


 


Urine Color   


 


Urine Appearance   


 


Urine pH   


 


Ur Specific Gravity   


 


Urine Protein   


 


Urine Glucose (UA)   


 


Urine Ketones   


 


Urine Blood   


 


Urine Nitrite   


 


Ur Leukocyte Esterase   


 


Urine WBC (Auto)   


 


Urine RBC (Auto)   














  03/09/19 03/09/19





  08:56 19:15


 


WBC  


 


RBC  


 


Hgb  


 


Hct  


 


MCV  


 


MCH  


 


MCHC  


 


RDW  


 


Plt Count  


 


Sodium  


 


Potassium  


 


Chloride  


 


Carbon Dioxide  


 


Anion Gap  


 


BUN  


 


Creatinine  


 


Est GFR ( Amer)  


 


Est GFR (Non-Af Amer)  


 


Glucose  


 


Calcium  


 


Magnesium  


 


Triglycerides  93 


 


Cholesterol  154.20 


 


LDL Cholesterol Direct  85 


 


VLDL Cholesterol  19.0 


 


HDL Cholesterol  50 


 


TSH  


 


Urine Color   YELLOW


 


Urine Appearance   SLIGHTLY-CLOUDY


 


Urine pH   7.0


 


Ur Specific Gravity   1.021


 


Urine Protein   NEGATIVE


 


Urine Glucose (UA)   NEGATIVE


 


Urine Ketones   NEGATIVE


 


Urine Blood   NEGATIVE


 


Urine Nitrite   NEGATIVE


 


Ur Leukocyte Esterase   NEGATIVE


 


Urine WBC (Auto)   2


 


Urine RBC (Auto)   1








                                        











  03/09/19 03/09/19 03/09/19





  03:15 08:56 08:56


 


Creatine Kinase   197 H 


 


CK-MB (CK-2)    0.42


 


Troponin I  < 0.012   < 0.012














  03/09/19 03/09/19 03/09/19





  15:07 15:07 21:06


 


Creatine Kinase  182 H   151 H


 


CK-MB (CK-2)   0.38 


 


Troponin I   < 0.012 














  03/09/19





  21:06


 


Creatine Kinase 


 


CK-MB (CK-2)  0.33


 


Troponin I  < 0.012











Impressions: 


                                        





Chest X-Ray  03/09/19 03:27


IMPRESSION:


 


No acute cardiopulmonary process


 


 


copyright 2011 [employer]- All Rights Reserved


 














Qualifiers





- *


PATIENT BEING DISCHARGED WITH ANY OF THE FOLLOWING DIAGNOSIS: No

## 2019-09-01 ENCOUNTER — HOSPITAL ENCOUNTER (EMERGENCY)
Dept: HOSPITAL 62 - ER | Age: 29
Discharge: HOME | End: 2019-09-01
Payer: SELF-PAY

## 2019-09-01 VITALS — SYSTOLIC BLOOD PRESSURE: 131 MMHG | DIASTOLIC BLOOD PRESSURE: 71 MMHG

## 2019-09-01 DIAGNOSIS — J98.01: ICD-10-CM

## 2019-09-01 DIAGNOSIS — T39.016A: ICD-10-CM

## 2019-09-01 DIAGNOSIS — R79.89: ICD-10-CM

## 2019-09-01 DIAGNOSIS — R06.2: ICD-10-CM

## 2019-09-01 DIAGNOSIS — F17.200: ICD-10-CM

## 2019-09-01 DIAGNOSIS — T44.7X6A: ICD-10-CM

## 2019-09-01 DIAGNOSIS — I48.91: ICD-10-CM

## 2019-09-01 DIAGNOSIS — R07.9: Primary | ICD-10-CM

## 2019-09-01 DIAGNOSIS — R06.02: ICD-10-CM

## 2019-09-01 DIAGNOSIS — Z91.128: ICD-10-CM

## 2019-09-01 DIAGNOSIS — Z91.14: ICD-10-CM

## 2019-09-01 LAB
ADD MANUAL DIFF: NO
ALBUMIN SERPL-MCNC: 4.5 G/DL (ref 3.5–5)
ALP SERPL-CCNC: 67 U/L (ref 38–126)
ANION GAP SERPL CALC-SCNC: 10 MMOL/L (ref 5–19)
AST SERPL-CCNC: 24 U/L (ref 14–36)
BASOPHILS # BLD AUTO: 0 10^3/UL (ref 0–0.2)
BASOPHILS NFR BLD AUTO: 0.7 % (ref 0–2)
BILIRUB DIRECT SERPL-MCNC: 0.2 MG/DL (ref 0–0.4)
BILIRUB SERPL-MCNC: 0.2 MG/DL (ref 0.2–1.3)
BUN SERPL-MCNC: 13 MG/DL (ref 7–20)
CALCIUM: 9.5 MG/DL (ref 8.4–10.2)
CHLORIDE SERPL-SCNC: 106 MMOL/L (ref 98–107)
CK MB SERPL-MCNC: 0.49 NG/ML (ref ?–4.55)
CK SERPL-CCNC: 220 U/L (ref 30–135)
CO2 SERPL-SCNC: 22 MMOL/L (ref 22–30)
D DIMER PPP FEU-MCNC: 0.57 UG/ML (ref 0–0.5)
EOSINOPHIL # BLD AUTO: 0.1 10^3/UL (ref 0–0.6)
EOSINOPHIL NFR BLD AUTO: 1.9 % (ref 0–6)
ERYTHROCYTE [DISTWIDTH] IN BLOOD BY AUTOMATED COUNT: 20.4 % (ref 11.5–14)
GLUCOSE SERPL-MCNC: 108 MG/DL (ref 75–110)
HCT VFR BLD CALC: 35.3 % (ref 36–47)
HGB BLD-MCNC: 11.6 G/DL (ref 12–15.5)
INR PPP: 1.01
LYMPHOCYTES # BLD AUTO: 2.3 10^3/UL (ref 0.5–4.7)
LYMPHOCYTES NFR BLD AUTO: 38.1 % (ref 13–45)
MCH RBC QN AUTO: 27.9 PG (ref 27–33.4)
MCHC RBC AUTO-ENTMCNC: 32.8 G/DL (ref 32–36)
MCV RBC AUTO: 85 FL (ref 80–97)
MONOCYTES # BLD AUTO: 0.3 10^3/UL (ref 0.1–1.4)
MONOCYTES NFR BLD AUTO: 5.7 % (ref 3–13)
NEUTROPHILS # BLD AUTO: 3.2 10^3/UL (ref 1.7–8.2)
NEUTS SEG NFR BLD AUTO: 53.6 % (ref 42–78)
PLATELET # BLD: 192 10^3/UL (ref 150–450)
POTASSIUM SERPL-SCNC: 4.3 MMOL/L (ref 3.6–5)
PROT SERPL-MCNC: 7.5 G/DL (ref 6.3–8.2)
PROTHROMBIN TIME: 13.3 SEC (ref 11.4–15.4)
RBC # BLD AUTO: 4.15 10^6/UL (ref 3.72–5.28)
TOTAL CELLS COUNTED % (AUTO): 100 %
TROPONIN I SERPL-MCNC: < 0.012 NG/ML
WBC # BLD AUTO: 6 10^3/UL (ref 4–10.5)

## 2019-09-01 PROCEDURE — 85379 FIBRIN DEGRADATION QUANT: CPT

## 2019-09-01 PROCEDURE — 71275 CT ANGIOGRAPHY CHEST: CPT

## 2019-09-01 PROCEDURE — 80053 COMPREHEN METABOLIC PANEL: CPT

## 2019-09-01 PROCEDURE — 96361 HYDRATE IV INFUSION ADD-ON: CPT

## 2019-09-01 PROCEDURE — 94640 AIRWAY INHALATION TREATMENT: CPT

## 2019-09-01 PROCEDURE — 84484 ASSAY OF TROPONIN QUANT: CPT

## 2019-09-01 PROCEDURE — 84703 CHORIONIC GONADOTROPIN ASSAY: CPT

## 2019-09-01 PROCEDURE — 85025 COMPLETE CBC W/AUTO DIFF WBC: CPT

## 2019-09-01 PROCEDURE — 85610 PROTHROMBIN TIME: CPT

## 2019-09-01 PROCEDURE — 99285 EMERGENCY DEPT VISIT HI MDM: CPT

## 2019-09-01 PROCEDURE — 36415 COLL VENOUS BLD VENIPUNCTURE: CPT

## 2019-09-01 PROCEDURE — 82550 ASSAY OF CK (CPK): CPT

## 2019-09-01 PROCEDURE — 71045 X-RAY EXAM CHEST 1 VIEW: CPT

## 2019-09-01 PROCEDURE — 96360 HYDRATION IV INFUSION INIT: CPT

## 2019-09-01 PROCEDURE — 93010 ELECTROCARDIOGRAM REPORT: CPT

## 2019-09-01 PROCEDURE — 82553 CREATINE MB FRACTION: CPT

## 2019-09-01 PROCEDURE — 93005 ELECTROCARDIOGRAM TRACING: CPT

## 2019-09-01 NOTE — RADIOLOGY REPORT (SQ)
EXAM DESCRIPTION:

CT CHEST ANGIOGRAPHY WITHOUT THEN WITH IV CONTRAST



COMPLETED DATE/TME:  09/01/2019 05:48



CLINICAL HISTORY:

29 years Female, sob



Comparison: CR, same day.



Technique:  IV contrast.  Coronal and sagittal reformat.  3d

reconstruction.  This exam was performed according to our

departmental dose-optimization program, which includes automated

exposure control, adjustment of the mA and/or kV according to

patient size and/or use of iterative reconstruction

technique.[facility]: Dose Right [facility]: CareDose   [facility]: Dose Right   

[facility]: Teradose 4D    [facility]: Smart Technologies



LIMITATIONS:

None



Findings: 



No pulmonary embolus. No right ventricular strain. Clear lungs.

Inferior neck, axillae, mediastinum, airway, lymphatics, heart,

vasculature, upper abdomen, and musculoskeleton appear otherwise

unremarkable.



Impression:  No pulmonary embolus.  No acute cardiopulmonary

findings.

## 2019-09-01 NOTE — RADIOLOGY REPORT (SQ)
EXAM DESCRIPTION:

XR CHEST 1 VIEW



COMPLETED DATE/TME:  09/01/2019 04:50



CLINICAL HISTORY:

29 years Female, CP



COMPARISON:Mar 09 2019,



NUMBER OF VIEWS/TECHNIQUE:

1/AP 



FINDINGS:



Adequate lung volume, clear parenchyma, normal cardiac

silhouette, and intact bony thorax.



IMPRESSION:



No acute cardiopulmonary findings.

## 2019-09-01 NOTE — ER DOCUMENT REPORT
ED General





- General


Chief Complaint: Chest Pain


Stated Complaint: CHEST PAIN


Time Seen by Provider: 19 04:49


Primary Care Provider: 


[facility] [Provider Group] - Follow up as needed


[facility] [Provider Group] - Follow up as needed


Notes: 





Patient is a 29-year-old female presents to the emergency department with left-

sided chest pain.  Patient states proximately an hour ago she was awoken from 

sleep with sharp left-sided chest pain.  Patient states that is intermittent in 

nature.  Patient states on 3/9/2019 patient was admitted to the hospital for 

atrial fibrillation.  States she was sent home with metoprolol and aspirin.  

Patient states she has been out of both of those medications for the last 4 

months.  Patient states she also never followed up with a cardiologist as 

suggested when she was discharged.  Patient's denying any history of asthma but 

is a current smoking history.


Patient states when the chest pain started she also felt short of breath.  

Patient's denying any diaphoresis.


Patient denies abdominal pain, nausea, vomiting, dysuria.


Patient states she takes no daily medications.


Patient states she is currently on her menstrual cycle.


TRAVEL OUTSIDE OF THE [place] IN LAST 30 DAYS: No





- Related Data


Allergies/Adverse Reactions: 


                                        





No Known Allergies Allergy (Verified 19 11:42)


   








Home Medications: none





Past Medical History





- General


Information source: Patient





- Social History


Smoking Status: Current Every Day Smoker


Frequency of alcohol use: None


Drug Abuse: None


Family History: DM, Hypertension


Patient has suicidal ideation: No


Patient has homicidal ideation: No





- Past Medical History


Cardiac Medical History: Reports: Hx Atrial Fibrillation


   Denies: Hx Coronary Artery Disease, Hx DVT, Hx Hypercholesterolemia, Hx 

Hypertension, Hx Pulmonary Embolism


Pulmonary Medical History: 


   Denies: Hx Asthma, Hx COPD


Neurological Medical History: Denies: Hx Migraine, Hx Seizures


Endocrine Medical History: Denies: Hx Diabetes Mellitus Type 1, Hx Diabetes 

Mellitus Type 2, Hx Hyperthyroidism, Hx Hypothyroidism


Renal/ Medical History: Denies: Hx Peritoneal Dialysis


GI Medical History: Denies: Hx Cirrhosis, Hx Hepatitis


Musculoskeletal Medical History: Denies Hx Arthritis, Denies Hx Fibromyalgia, 

Denies Hx Gout


Skin Medical History: Denies Hx Eczema, Denies Hx Psoriasis


Infectious Medical History: Denies: Hx Hepatitis


Past Surgical History: Reports: Hx  Section





- Immunizations


Hx Diphtheria, Pertussis, Tetanus Vaccination: Yes





Review of Systems





- Review of Systems


Constitutional: denies: Fever


EENT: No symptoms reported


Cardiovascular: See HPI


Respiratory: See HPI


Gastrointestinal: No symptoms reported


Genitourinary: No symptoms reported


Female Genitourinary: No symptoms reported


Musculoskeletal: No symptoms reported


Skin: No symptoms reported


Hematologic/Lymphatic: No symptoms reported


Neurological/Psychological: No symptoms reported





Physical Exam





- Vital signs


Vitals: 


                                        











Pulse Ox


 


 100 


 


 19 04:38














- Notes


Notes: 





GENERAL: Alert, interacts well. No acute distress.


HEAD: Normocephalic, atraumatic.


EYES: Pupils equal, round, and reactive to light. Extraocular movements intact.


ENT: Oral mucosa moist, tongue midline. 


NECK: Full range of motion. Supple. Trachea midline.


LUNGS: Slight end expiratory wheeze heard left base and right apices, no 

discernible rales, or rhonchi. No respiratory distress.


HEART: Regular rate and rhythm. No murmur


ABDOMEN: Soft, non-tender. Non-distended. Bowel sounds present in all 4 

quadrants.


EXTREMITIES: Moves all 4 extremities spontaneously. No edema, normal radial and 

dorsalis pedis pulses bilaterally. No cyanosis.


BACK: no cervical, thoracic, lumbar midline tenderness. No saddle anesthesia, 

normal distal neurovascular exam. 


NEUROLOGICAL: Alert and oriented x3. Normal speech. cranial nerves II through 

XII grossly intact 


PSYCH: Normal affect, normal mood.


SKIN: Warm, dry, normal turgor. No rashes or lesions noted.








Course





- Re-evaluation


Re-evalutation: 


Patient is a 29-year-old female presents to the emergency department for left-

sided chest pain.  Patient does have a history of atrial fibrillation and is 

supposed to be on metoprolol and aspirin.  Patient's EKG shows a sinus rhythm 

rate of 84, QTc 445, no ST segment elevations or depressions.





Discussing this case with my attending Dr. Johnson he is suggesting a d-dimer at 

this time.  Based on patient's sharp left-sided nonreproducible chest pain and 

slight wheeze as patient has no history of asthma.





D-dimer did come back elevated.  Again discussed this case with Dr. Johnson who 

states to proceed with CTA at this time.








                                        





Chest X-Ray  19 04:50


IMPRESSION:


 


No acute cardiopulmonary findings.


 








CTA shows no signs of pulmonary embolus.





Upon reevaluation of the patient she is sleeping comfortably, easily arousable 

with verbal stimuli.


Patient voices that she no longer has the chest pain.


Reexamination of patient's lungs reveal clear lung sounds in all fields.


Patient was treated with a DuoNeb treatment in the emergency department for her 

generalized wheeze.  Discussed continued use of albuterol treatments as well as 

steroids.





Discussed follow-up at [facility] and [facility] as she is 

uninsured.





At this time will discharge with return precautions and follow-up 

recommendations.  Verbal discharge instructions given a the bedside and oppo

rtunity for questions given. Medication warnings reviewed. Patient is in 

agreement with this plan and has verbalized understanding of return precautions 

and the need for primary care follow-up in the next 24-72 hours.





This medical record was dictated with voice recognizing software.  There may be 

grammatical, syntax errors that are unintended.











- Vital Signs


Vital signs: 


                                        











Temp Pulse Resp BP Pulse Ox


 


 98.4 F      20   131/71 H  100 


 


 19 04:49     19 07:00  19 06:01  19 07:00














- Laboratory


Result Diagrams: 


                                 19 04:47





                                 19 04:47


Laboratory results interpreted by me: 


                                        











  19





  04:47 04:47 04:47


 


Hgb  11.6 L  


 


Hct  35.3 L  


 


RDW  20.4 H  


 


D-Dimer   0.57 H 


 


Creatine Kinase    220 H














Discharge





- Discharge


Clinical Impression: 


 Bronchospasm





Chest pain


Qualifiers:


 Chest pain type: unspecified Qualified Code(s): R07.9 - Chest pain, unspecified





Condition: Stable


Disposition: HOME, SELF-CARE


Instructions:  Bronchospasm ([facility]), Chest Pain of Unclear Cause ([facility])


Additional Instructions: 


As we discussed you have been seen and treated in the emergency department for 

your generalized left-sided chest pain.  Your initial physical exam does reveal 

wheezing in both lungs.  Please use albuterol inhaler only as needed for 

respiratory distress.  Please take steroids as prescribed.  Please follow-up at 

[facility] or [facility].  These are to clinics you can go to 

although you are uninsured.  Please return to the emergency room for any further

concerns.


Prescriptions: 


Prednisone [Deltasone 20 mg Tablet] 3 tab PO DAILY 5 Days  tablet


Albuterol Sulfate [Proair HFA Inhalation Aerosol 8.5 gm MDI] 2 puff IH Q4H PRN 

#1 mdi


 PRN Reason: 


Referrals: 


[facility] [Provider Group] - Follow up as needed


[facility] [Provider Group] - Follow up as needed

## 2019-12-29 ENCOUNTER — HOSPITAL ENCOUNTER (EMERGENCY)
Dept: HOSPITAL 62 - ER | Age: 29
Discharge: HOME | End: 2019-12-29
Payer: SELF-PAY

## 2019-12-29 VITALS — SYSTOLIC BLOOD PRESSURE: 114 MMHG | DIASTOLIC BLOOD PRESSURE: 81 MMHG

## 2019-12-29 DIAGNOSIS — R10.9: ICD-10-CM

## 2019-12-29 DIAGNOSIS — R11.2: ICD-10-CM

## 2019-12-29 DIAGNOSIS — M79.10: ICD-10-CM

## 2019-12-29 DIAGNOSIS — N10: ICD-10-CM

## 2019-12-29 DIAGNOSIS — I48.91: ICD-10-CM

## 2019-12-29 DIAGNOSIS — N73.9: Primary | ICD-10-CM

## 2019-12-29 DIAGNOSIS — F17.200: ICD-10-CM

## 2019-12-29 LAB
A TYPE INFLUENZA AG: NEGATIVE
ADD MANUAL DIFF: NO
ALBUMIN SERPL-MCNC: 4.7 G/DL (ref 3.5–5)
ALP SERPL-CCNC: 87 U/L (ref 38–126)
ANION GAP SERPL CALC-SCNC: 19 MMOL/L (ref 5–19)
APPEARANCE UR: (no result)
APTT PPP: YELLOW S
AST SERPL-CCNC: 68 U/L (ref 14–36)
B INFLUENZA AG: NEGATIVE
BACTERIA (WET MOUNT): (no result)
BASOPHILS # BLD AUTO: 0 10^3/UL (ref 0–0.2)
BASOPHILS NFR BLD AUTO: 0.7 % (ref 0–2)
BILIRUB DIRECT SERPL-MCNC: 0.3 MG/DL (ref 0–0.4)
BILIRUB SERPL-MCNC: 0.7 MG/DL (ref 0.2–1.3)
BILIRUB UR QL STRIP: NEGATIVE
BUN SERPL-MCNC: 13 MG/DL (ref 7–20)
CALCIUM: 9.7 MG/DL (ref 8.4–10.2)
CHLAM PCR: NOT DETECTED
CHLORIDE SERPL-SCNC: 95 MMOL/L (ref 98–107)
CO2 SERPL-SCNC: 23 MMOL/L (ref 22–30)
EOSINOPHIL # BLD AUTO: 0.1 10^3/UL (ref 0–0.6)
EOSINOPHIL NFR BLD AUTO: 0.9 % (ref 0–6)
ERYTHROCYTE [DISTWIDTH] IN BLOOD BY AUTOMATED COUNT: 20.4 % (ref 11.5–14)
GLUCOSE SERPL-MCNC: 108 MG/DL (ref 75–110)
GLUCOSE UR STRIP-MCNC: NEGATIVE MG/DL
HCT VFR BLD CALC: 38 % (ref 36–47)
HGB BLD-MCNC: 13.3 G/DL (ref 12–15.5)
KETONES UR STRIP-MCNC: (no result) MG/DL
LYMPHOCYTES # BLD AUTO: 1.4 10^3/UL (ref 0.5–4.7)
LYMPHOCYTES NFR BLD AUTO: 22.7 % (ref 13–45)
MCH RBC QN AUTO: 29.5 PG (ref 27–33.4)
MCHC RBC AUTO-ENTMCNC: 35.1 G/DL (ref 32–36)
MCV RBC AUTO: 84 FL (ref 80–97)
MONOCYTES # BLD AUTO: 0.5 10^3/UL (ref 0.1–1.4)
MONOCYTES NFR BLD AUTO: 7.6 % (ref 3–13)
NEUTROPHILS # BLD AUTO: 4.3 10^3/UL (ref 1.7–8.2)
NEUTS SEG NFR BLD AUTO: 68.1 % (ref 42–78)
NITRITE UR QL STRIP: NEGATIVE
PH UR STRIP: 6 [PH] (ref 5–9)
PLATELET # BLD: 223 10^3/UL (ref 150–450)
POTASSIUM SERPL-SCNC: 3.3 MMOL/L (ref 3.6–5)
PROT SERPL-MCNC: 8.6 G/DL (ref 6.3–8.2)
PROT UR STRIP-MCNC: 100 MG/DL
RBC # BLD AUTO: 4.52 10^6/UL (ref 3.72–5.28)
RBCS (WET MOUNT): (no result)
SP GR UR STRIP: 1.02
T.VAGINALIS (WET MOUNT): (no result)
TOTAL CELLS COUNTED % (AUTO): 100 %
UROBILINOGEN UR-MCNC: 2 MG/DL (ref ?–2)
WBC # BLD AUTO: 6.3 10^3/UL (ref 4–10.5)
WBCS (WET MOUNT): (no result)
YEAST (WET MOUNT): (no result)

## 2019-12-29 PROCEDURE — 83690 ASSAY OF LIPASE: CPT

## 2019-12-29 PROCEDURE — 86308 HETEROPHILE ANTIBODY SCREEN: CPT

## 2019-12-29 PROCEDURE — 87591 N.GONORRHOEAE DNA AMP PROB: CPT

## 2019-12-29 PROCEDURE — 87210 SMEAR WET MOUNT SALINE/INK: CPT

## 2019-12-29 PROCEDURE — 93005 ELECTROCARDIOGRAM TRACING: CPT

## 2019-12-29 PROCEDURE — 96361 HYDRATE IV INFUSION ADD-ON: CPT

## 2019-12-29 PROCEDURE — 93010 ELECTROCARDIOGRAM REPORT: CPT

## 2019-12-29 PROCEDURE — 87491 CHLMYD TRACH DNA AMP PROBE: CPT

## 2019-12-29 PROCEDURE — 81001 URINALYSIS AUTO W/SCOPE: CPT

## 2019-12-29 PROCEDURE — 80053 COMPREHEN METABOLIC PANEL: CPT

## 2019-12-29 PROCEDURE — 87186 SC STD MICRODIL/AGAR DIL: CPT

## 2019-12-29 PROCEDURE — 99284 EMERGENCY DEPT VISIT MOD MDM: CPT

## 2019-12-29 PROCEDURE — 87088 URINE BACTERIA CULTURE: CPT

## 2019-12-29 PROCEDURE — 36415 COLL VENOUS BLD VENIPUNCTURE: CPT

## 2019-12-29 PROCEDURE — 85025 COMPLETE CBC W/AUTO DIFF WBC: CPT

## 2019-12-29 PROCEDURE — 96375 TX/PRO/DX INJ NEW DRUG ADDON: CPT

## 2019-12-29 PROCEDURE — 71046 X-RAY EXAM CHEST 2 VIEWS: CPT

## 2019-12-29 PROCEDURE — 87086 URINE CULTURE/COLONY COUNT: CPT

## 2019-12-29 PROCEDURE — 96365 THER/PROPH/DIAG IV INF INIT: CPT

## 2019-12-29 PROCEDURE — 87804 INFLUENZA ASSAY W/OPTIC: CPT

## 2019-12-29 PROCEDURE — 81025 URINE PREGNANCY TEST: CPT

## 2019-12-29 NOTE — ER DOCUMENT REPORT
ED Flu Like





- General


Chief Complaint: Flu Symptoms


Stated Complaint: FLANK PAIN/BODY ACHE/NAUSEA/VOMITING


Time Seen by Provider: 19 12:54


Primary Care Provider: 


[facility] [Provider Group] - Follow up as needed


Mode of Arrival: Ambulatory


Notes: 





Patient is a 29-year-old female with a history of atrial fibrillation who 

presents to the emergency department with right flank pain.  Patient states that

her symptoms started 5 days ago.  She called the ambulance at that time, but 

decided not to come in.  She states that her symptoms have gotten progressively 

worse.  She states that she feels like she has body aches all over.  Patient is 

sexually active and states that she is currently on her menstrual cycle.


TRAVEL OUTSIDE OF THE [place] IN LAST 30 DAYS: No





- Related Data


Allergies/Adverse Reactions: 


                                        





No Known Allergies Allergy (Verified 19 12:55)


   











Past Medical History





- General


Information source: Patient





- Social History


Smoking Status: Current Every Day Smoker


Chew tobacco use (# tins/day): No


Frequency of alcohol use: Occasional


Drug Abuse: None


Family History: DM, Hypertension


Patient has suicidal ideation: No


Patient has homicidal ideation: No





- Past Medical History


Cardiac Medical History: Reports: Hx Atrial Fibrillation


   Denies: Hx Coronary Artery Disease, Hx DVT, Hx Hypercholesterolemia, Hx 

Hypertension, Hx Pulmonary Embolism


Pulmonary Medical History: 


   Denies: Hx Asthma, Hx COPD


Neurological Medical History: Denies: Hx Migraine, Hx Seizures


Endocrine Medical History: Denies: Hx Diabetes Mellitus Type 1, Hx Diabetes 

Mellitus Type 2, Hx Hyperthyroidism, Hx Hypothyroidism


Renal/ Medical History: Denies: Hx Peritoneal Dialysis


GI Medical History: Denies: Hx Cirrhosis, Hx Hepatitis


Musculoskeletal Medical History: Denies Hx Arthritis, Denies Hx Fibromyalgia, 

Denies Hx Gout


Skin Medical History: Denies Hx Eczema, Denies Hx Psoriasis


Infectious Medical History: Denies: Hx Hepatitis


Past Surgical History: Reports: Hx  Section





- Immunizations


Hx Diphtheria, Pertussis, Tetanus Vaccination: Yes





Review of Systems





- Review of Systems


Notes: 





REVIEW OF SYSTEMS:





CONSTITUTIONAL :    Denies recent illness.  Denies recent unintentional weight 

loss.  Denies fever,  chills, or sweats. 


EENT: Denies eye, ear, throat, or mouth pain, discharge, or symptoms.  Denies 

nasal or sinus congestion.


CARDIOVASCULAR:  Denies chest pain.


RESPIRATORY: Denies shortness of breath, cough, congestion, difficulty 

breathing, or wheezing. 


GASTROINTESTINAL: See HPI.


GENITOURINARY: See HPI.


MUSCULOSKELETAL:  Denies neck and back pain.  Denies joint pain or swelling.


SKIN:   Denies rash, itchiness, or lesions


HEMATOLOGIC :   Denies easy bruising or bleeding.


LYMPHATIC:  Denies swollen, painful, enlarged glands.


NEUROLOGICAL: Denies no numbness or tingling denies weakness.  Denies headache. 

Denies altered mental status.  Denies alteration in speech.


PSYCHIATRIC:  Denies stress, anxiety, alteration in sleep patterns, or 

depression.





All other systems reviewed and negative.





Physical Exam





- Vital signs


Vitals: 


                                        











Temp Pulse Resp BP Pulse Ox


 


 98.3 F   128 H  16   114/81   100 


 


 19 12:57  19 12:57  19 12:57  19 12:57  19 12:57














- Notes


Notes: 





PHYSICAL EXAMINATION:





GENERAL: Appears well, healthy, well-nourished, no acute distress. 





HEAD:  Normocephalic, atraumatic.





EYES:  PERRL, conjunctiva normal, all extraocular movements intact, sclera 

nonicteric





ENT:  Moist mucous membranes. 





NECK: Supple, no noticeable swelling, redness, rash.  Normal range of motion.





LUNGS: Equal breath sounds bilaterally and clear to auscultation.  No wheezes 

rales or rhonchi.





CARDIOVASCULAR: S1-S2, regular rate, regular rhythm.  Radial pulses 2+, normal.





ABDOMEN: Normoactive bowel sounds.  Soft, nontender,  no guarding, no rebound 

tenderness, and no masses palpated.





EXTREMITIES: Normal strength and range of motion, no pitting or edema.  No 

cyanosis. 





NEUROLOGICAL: Moves all extremities upon command.  Strength 5/5 in all 

extremities. 





PSYCH: Normal mood, normal affect.





SKIN: Warm, dry.  No rash, lesions, ulcerations noted.  Normal skin turgor.





Course





- Re-evaluation


Re-evalutation: 





19 15:04


Patient's hematology is unremarkable.  Potassium is 3.3.  This is most likely 

due to the patient not eating and having diarrhea.  Urinalysis shows large 

amount of leukocytes.  She does have a moderate amount of blood, but she is also

on her menstrual cycle.  Influenza test and mono tests are negative.


19 15:10


Pelvic exam done with IVONNE Henderson at bedside.  Patient did have cervical motion

tenderness. 3+ bacteria seen and 1+ WBCs noted on wet mount.  She will be 

treated with doxycycline and Flagyl.  I educated the patient on not drinking 

alcohol while on this medication.  She is in agreement with this plan.  Follow-

up precautions were given.  Verbal discharge instructions were given to the 

patient.  They verbalized understanding.  They are stable for discharge.














- Vital Signs


Vital signs: 


                                        











Temp Pulse Resp BP Pulse Ox


 


 98.3 F   128 H  16   114/81   100 


 


 19 12:57  19 12:57  19 12:57  19 12:57  19 12:57














- Laboratory


Result Diagrams: 


                                 19 13:24





                                 19 13:24


Laboratory results interpreted by me: 


                                        











  19





  13:24 13:24 13:24


 


RDW  20.4 H  


 


Potassium   3.3 L 


 


Chloride   95 L 


 


AST   68 H 


 


Total Protein   8.6 H 


 


Urine Protein    100 H


 


Urine Ketones    TRACE H


 


Urine Blood    MODERATE H


 


Urine Urobilinogen    2.0 H


 


Ur Leukocyte Esterase    LARGE H














Discharge





- Discharge


Clinical Impression: 


 PID (pelvic inflammatory disease)





Urinary tract infection


Qualifiers:


 Urinary tract infection type: acute pyelonephritis Qualified Code(s): N10 - 

Acute pyelonephritis





Condition: Stable


Disposition: HOME, SELF-CARE


Instructions:  Urinary Tract Infection ([facility])


Additional Instructions: 


Your urine shows findings consistent with a urinary tract infection.  Please 

take all the antibiotics as directed even if your symptoms have improved.  

Please follow-up with your primary care physician as needed.  Return to 

emergency room if you develop fever >101F, persistent vomiting, become 

lethargic, have severe pain in your sides, or any other symptoms that are 

concerning to you.





Your are being treated for pelvic inflammatory disease.  You are being started 

on 2 different antibiotics and you need to take these until you finish them.  

Please return if you have worsening pain, persistent vomiting, spike a fever 

greater than 101F, or have any other symptoms that are concerning to you.  

Please follow closely with you primary care physician or your OB/GYN at your 

earliest ability.


Prescriptions: 


Doxycycline Hyclate 100 mg PO BID 14 Days #28 capsule


Metronidazole [Flagyl 500 mg Tablet] 500 mg PO Q6H 7 Days #28 tablet


Referrals: 


[facility] [Provider Group] - Follow up as needed

## 2019-12-29 NOTE — ER DOCUMENT REPORT
ED Medical Screen (RME)





- General


Chief Complaint: Flank Pain


Stated Complaint: FLANK PAIN/BODY ACHE/NAUSEA/VOMITING


Time Seen by Provider: 19 12:54


Mode of Arrival: Ambulatory


Information source: Patient


Notes: 





29-year-old female presents to the emergency department with multiple 

complaints.  Reports her right side hurts.  She reports chest congestion.  Body 

aches.  Reports she has not had anything to eat since Tuesday because she keeps 

vomiting it up.  She reports she called the ambulance Tuesday for her right side

pain but they reported that her ribs were not broken.  Review of patient's chart

shows she does have history of atrial fibrillation but reports she does not take

anything for this.  She reports she takes an aspirin a day








I have greeted and performed a rapid initial assessment of this patient.  A 

comprehensive ED assessment and evaluation of the patient, analysis of test 

results and completion of the medical decision making process will be conducted 

by additional ED providers.


TRAVEL OUTSIDE OF THE [place] IN LAST 30 DAYS: No





- Related Data


Allergies/Adverse Reactions: 


                                        





No Known Allergies Allergy (Verified 19 12:55)


   











Past Medical History





- Past Medical History


Cardiac Medical History: Reports: Hx Atrial Fibrillation


   Denies: Hx Coronary Artery Disease, Hx DVT, Hx Hypercholesterolemia, Hx 

Hypertension, Hx Pulmonary Embolism


Pulmonary Medical History: 


   Denies: Hx Asthma, Hx COPD


Neurological Medical History: Denies: Hx Migraine, Hx Seizures


Endocrine Medical History: Denies: Hx Diabetes Mellitus Type 1, Hx Diabetes 

Mellitus Type 2, Hx Hyperthyroidism, Hx Hypothyroidism


Renal/ Medical History: Denies: Hx Peritoneal Dialysis


GI Medical History: Denies: Hx Cirrhosis, Hx Hepatitis


Musculoskeltal Medical History: Denies Hx Arthritis, Denies Hx Fibromyalgia, 

Denies Hx Gout


Skin Medical History: Denies Hx Eczema, Denies Hx Psoriasis


Infectious Medical History: Denies: Hx Hepatitis


Past Surgical History: Reports: Hx  Section





- Immunizations


Hx Diphtheria, Pertussis, Tetanus Vaccination: Yes

## 2019-12-29 NOTE — EKG REPORT
SEVERITY:- ABNORMAL ECG -

SINUS TACHYCARDIA

BORDERLINE T ABNORMALITIES, ANTERIOR LEADS

PROLONGED QT INTERVAL

:

Confirmed by: Kimberley Moreno MD 29-Dec-2019 19:28:32
